# Patient Record
Sex: FEMALE | Race: WHITE | NOT HISPANIC OR LATINO | Employment: FULL TIME | URBAN - METROPOLITAN AREA
[De-identification: names, ages, dates, MRNs, and addresses within clinical notes are randomized per-mention and may not be internally consistent; named-entity substitution may affect disease eponyms.]

---

## 2023-03-21 ENCOUNTER — TELEPHONE (OUTPATIENT)
Dept: PRIMARY CARE | Facility: CLINIC | Age: 68
End: 2023-03-21
Payer: MEDICARE

## 2023-03-21 NOTE — TELEPHONE ENCOUNTER
----- Message from Loni Zepeda CMA sent at 3/20/2023  1:59 PM EDT -----  Left message for patient to return call.

## 2023-03-24 ENCOUNTER — TELEPHONE (OUTPATIENT)
Dept: PRIMARY CARE | Facility: CLINIC | Age: 68
End: 2023-03-24
Payer: MEDICARE

## 2023-03-24 NOTE — TELEPHONE ENCOUNTER
Patient called and stated that Dr Grossman was treating her for the Osteoporosis, but does not remember what the medication is that he gave her . So she is going to call their office and find out and call [facility] back.

## 2023-08-09 LAB — ALCOHOL (MG/DL) IN URINE: <10 MG/DL

## 2023-08-11 LAB — ETHYL GLUCURONIDE SCREEN: NEGATIVE NG/ML

## 2023-08-14 LAB
BUPRENORPHINE ,URINE: <2 NG/ML
BUPRENORPHINE GLUC, URINE: <5 NG/ML
NALOXONE, URINE: <100 NG/ML
NORBUPRENORPHINE GLUC,URINE: <5 NG/ML
NORBUPRENORPHINE, URINE: <2 NG/ML

## 2023-08-15 LAB
6-ACETYLMORPHINE: <25 NG/ML
7-AMINOCLONAZEPAM: <25 NG/ML
ALPHA-HYDROXYALPRAZOLAM: <25 NG/ML
ALPHA-HYDROXYMIDAZOLAM: <25 NG/ML
ALPRAZOLAM: <25 NG/ML
AMPHETAMINE (PRESENCE) IN URINE BY SCREEN METHOD: ABNORMAL
BARBITURATES PRESENCE IN URINE BY SCREEN METHOD: ABNORMAL
CANNABINOIDS IN URINE BY SCREEN METHOD: ABNORMAL
CHLORDIAZEPOXIDE: <25 NG/ML
CLONAZEPAM: <25 NG/ML
COCAINE (PRESENCE) IN URINE BY SCREEN METHOD: ABNORMAL
CODEINE: <50 NG/ML
CREATINE, URINE FOR DRUG: 38.4 MG/DL
DIAZEPAM: <25 NG/ML
DRUG SCREEN COMMENT URINE: ABNORMAL
EDDP: <25 NG/ML
FENTANYL CONFIRMATION, URINE: <2.5 NG/ML
HYDROCODONE: <25 NG/ML
HYDROMORPHONE: 80 NG/ML
LORAZEPAM: <25 NG/ML
METHADONE CONFIRMATION,URINE: <25 NG/ML
MIDAZOLAM: <25 NG/ML
MORPHINE URINE: >2500 NG/ML
N-DESMETHYLTAPENTADOL: <25 NG/ML
NORDIAZEPAM: <25 NG/ML
NORFENTANYL: <2.5 NG/ML
NORHYDROCODONE: <25 NG/ML
NOROXYCODONE: <25 NG/ML
O-DESMETHYLTRAMADOL: <50 NG/ML
OXAZEPAM: <25 NG/ML
OXYCODONE: <25 NG/ML
OXYMORPHONE: <25 NG/ML
PHENCYCLIDINE (PRESENCE) IN URINE BY SCREEN METHOD: ABNORMAL
TAPENTADOL: <25 NG/ML
TEMAZEPAM: <25 NG/ML
TRAMADOL: <50 NG/ML
ZOLPIDEM METABOLITE (ZCA): <25 NG/ML
ZOLPIDEM: <25 NG/ML

## 2023-09-13 PROBLEM — R52 PAIN AFTER RADIATION THERAPY: Status: ACTIVE | Noted: 2023-09-13

## 2023-09-13 PROBLEM — E03.9 ACQUIRED HYPOTHYROIDISM: Status: ACTIVE | Noted: 2023-09-13

## 2023-09-13 PROBLEM — G89.29 ABDOMINAL PAIN, CHRONIC, RIGHT UPPER QUADRANT: Status: ACTIVE | Noted: 2023-09-13

## 2023-09-13 PROBLEM — J44.9 CHRONIC OBSTRUCTIVE PULMONARY DISEASE (MULTI): Status: ACTIVE | Noted: 2023-09-13

## 2023-09-13 PROBLEM — M54.50 LUMBAGO: Status: ACTIVE | Noted: 2023-09-13

## 2023-09-13 PROBLEM — G89.3 CANCER-RELATED PAIN: Status: ACTIVE | Noted: 2023-09-13

## 2023-09-13 PROBLEM — R63.0 ANOREXIA: Status: ACTIVE | Noted: 2023-09-13

## 2023-09-13 PROBLEM — M25.511 RIGHT SHOULDER PAIN: Status: ACTIVE | Noted: 2023-09-13

## 2023-09-13 PROBLEM — R13.12 DYSPHAGIA, OROPHARYNGEAL PHASE: Status: ACTIVE | Noted: 2023-09-13

## 2023-09-13 PROBLEM — E55.9 VITAMIN D DEFICIENCY: Status: ACTIVE | Noted: 2023-09-13

## 2023-09-13 PROBLEM — K86.1 CHRONIC PANCREATITIS (MULTI): Status: ACTIVE | Noted: 2023-09-13

## 2023-09-13 PROBLEM — R10.11 ABDOMINAL PAIN, CHRONIC, RIGHT UPPER QUADRANT: Status: ACTIVE | Noted: 2023-09-13

## 2023-09-13 PROBLEM — Q45.3 PANCREATIC ABNORMALITY: Status: ACTIVE | Noted: 2023-09-13

## 2023-09-13 PROBLEM — C02.3 CANCER OF ANTERIOR TWO-THIRDS OF TONGUE (MULTI): Status: ACTIVE | Noted: 2023-09-13

## 2023-09-13 PROBLEM — E05.90 HYPERTHYROIDISM: Status: ACTIVE | Noted: 2023-09-13

## 2023-09-13 PROBLEM — M25.50 POLYARTHRALGIA: Status: ACTIVE | Noted: 2023-09-13

## 2023-09-13 PROBLEM — R91.8 MULTIPLE LUNG NODULES: Status: ACTIVE | Noted: 2023-09-13

## 2023-09-13 PROBLEM — K83.8: Status: ACTIVE | Noted: 2023-09-13
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
## 2023-09-13 PROBLEM — R51.9 FACIAL PAIN: Status: ACTIVE | Noted: 2023-09-13

## 2023-09-13 RX ORDER — ALBUTEROL SULFATE 90 UG/1
2 AEROSOL, METERED RESPIRATORY (INHALATION) EVERY 4 HOURS PRN
COMMUNITY
Start: 2020-04-13

## 2023-09-13 RX ORDER — DOCUSATE SODIUM 50 MG/5ML
10 LIQUID ORAL 2 TIMES DAILY
COMMUNITY
Start: 2020-11-04 | End: 2023-10-16 | Stop reason: ALTCHOICE

## 2023-09-13 RX ORDER — ERGOCALCIFEROL 1.25 MG/1
1 CAPSULE ORAL WEEKLY
COMMUNITY
Start: 2022-05-30 | End: 2023-12-26

## 2023-09-13 RX ORDER — DULOXETIN HYDROCHLORIDE 30 MG/1
1 CAPSULE, DELAYED RELEASE ORAL DAILY
COMMUNITY
Start: 2023-06-12 | End: 2023-10-16 | Stop reason: ALTCHOICE

## 2023-09-13 RX ORDER — DRONABINOL 5 MG/ML
SOLUTION ORAL EVERY 12 HOURS
COMMUNITY
Start: 2023-08-09 | End: 2023-10-16 | Stop reason: ALTCHOICE

## 2023-09-13 RX ORDER — MORPHINE SULFATE 20 MG/ML
SOLUTION ORAL
COMMUNITY
Start: 2022-03-02 | End: 2023-10-25 | Stop reason: SDUPTHER

## 2023-09-13 RX ORDER — UMECLIDINIUM BROMIDE AND VILANTEROL TRIFENATATE 62.5; 25 UG/1; UG/1
1 POWDER RESPIRATORY (INHALATION) DAILY
COMMUNITY
Start: 2022-08-26

## 2023-09-13 RX ORDER — NALOXONE HYDROCHLORIDE 4 MG/.1ML
SPRAY NASAL
COMMUNITY
Start: 2022-05-12

## 2023-09-13 RX ORDER — PROPRANOLOL HYDROCHLORIDE 20 MG/1
20 TABLET ORAL 4 TIMES DAILY
COMMUNITY
End: 2023-10-16 | Stop reason: ALTCHOICE

## 2023-09-14 ENCOUNTER — APPOINTMENT (OUTPATIENT)
Dept: PRIMARY CARE | Facility: CLINIC | Age: 68
End: 2023-09-14
Payer: MEDICARE

## 2023-10-16 ENCOUNTER — LAB (OUTPATIENT)
Dept: LAB | Facility: LAB | Age: 68
End: 2023-10-16
Payer: MEDICARE

## 2023-10-16 ENCOUNTER — OFFICE VISIT (OUTPATIENT)
Dept: PRIMARY CARE | Facility: CLINIC | Age: 68
End: 2023-10-16
Payer: MEDICARE

## 2023-10-16 VITALS
BODY MASS INDEX: 17.49 KG/M2 | DIASTOLIC BLOOD PRESSURE: 84 MMHG | SYSTOLIC BLOOD PRESSURE: 130 MMHG | WEIGHT: 105 LBS | HEART RATE: 105 BPM | HEIGHT: 65 IN

## 2023-10-16 DIAGNOSIS — E03.9 ACQUIRED HYPOTHYROIDISM: ICD-10-CM

## 2023-10-16 DIAGNOSIS — E55.9 VITAMIN D DEFICIENCY: ICD-10-CM

## 2023-10-16 DIAGNOSIS — E05.90 HYPERTHYROIDISM: ICD-10-CM

## 2023-10-16 DIAGNOSIS — E05.90 HYPERTHYROIDISM: Primary | ICD-10-CM

## 2023-10-16 DIAGNOSIS — E44.0 MALNUTRITION OF MODERATE DEGREE (MULTI): ICD-10-CM

## 2023-10-16 DIAGNOSIS — C78.7 MALIGNANT NEOPLASM METASTATIC TO LIVER (MULTI): ICD-10-CM

## 2023-10-16 DIAGNOSIS — J44.9 CHRONIC OBSTRUCTIVE PULMONARY DISEASE, UNSPECIFIED COPD TYPE (MULTI): ICD-10-CM

## 2023-10-16 DIAGNOSIS — Z00.00 MEDICARE ANNUAL WELLNESS VISIT, SUBSEQUENT: ICD-10-CM

## 2023-10-16 DIAGNOSIS — K86.1 CHRONIC PANCREATITIS, UNSPECIFIED PANCREATITIS TYPE (MULTI): ICD-10-CM

## 2023-10-16 LAB
ALBUMIN SERPL BCP-MCNC: 4.8 G/DL (ref 3.4–5)
ALP SERPL-CCNC: 161 U/L (ref 33–136)
ALT SERPL W P-5'-P-CCNC: 89 U/L (ref 7–45)
ANION GAP SERPL CALC-SCNC: 15 MMOL/L (ref 10–20)
AST SERPL W P-5'-P-CCNC: 95 U/L (ref 9–39)
BILIRUB SERPL-MCNC: 0.6 MG/DL (ref 0–1.2)
BUN SERPL-MCNC: 13 MG/DL (ref 6–23)
CALCIUM SERPL-MCNC: 10.1 MG/DL (ref 8.6–10.3)
CHLORIDE SERPL-SCNC: 96 MMOL/L (ref 98–107)
CHOLEST SERPL-MCNC: 212 MG/DL (ref 0–199)
CHOLESTEROL/HDL RATIO: 2.8
CO2 SERPL-SCNC: 27 MMOL/L (ref 21–32)
CREAT SERPL-MCNC: 0.7 MG/DL (ref 0.5–1.05)
ERYTHROCYTE [DISTWIDTH] IN BLOOD BY AUTOMATED COUNT: 13.5 % (ref 11.5–14.5)
GFR SERPL CREATININE-BSD FRML MDRD: >90 ML/MIN/1.73M*2
GLUCOSE SERPL-MCNC: 98 MG/DL (ref 74–99)
HCT VFR BLD AUTO: 43.2 % (ref 36–46)
HDLC SERPL-MCNC: 75.1 MG/DL
HGB BLD-MCNC: 13.9 G/DL (ref 12–16)
LDLC SERPL CALC-MCNC: 119 MG/DL
MCH RBC QN AUTO: 28.5 PG (ref 26–34)
MCHC RBC AUTO-ENTMCNC: 32.2 G/DL (ref 32–36)
MCV RBC AUTO: 89 FL (ref 80–100)
NON HDL CHOLESTEROL: 137 MG/DL (ref 0–149)
NRBC BLD-RTO: 0 /100 WBCS (ref 0–0)
PLATELET # BLD AUTO: 362 X10*3/UL (ref 150–450)
PMV BLD AUTO: 12 FL (ref 7.5–11.5)
POTASSIUM SERPL-SCNC: 4.5 MMOL/L (ref 3.5–5.3)
PROT SERPL-MCNC: 7.6 G/DL (ref 6.4–8.2)
RBC # BLD AUTO: 4.88 X10*6/UL (ref 4–5.2)
SODIUM SERPL-SCNC: 133 MMOL/L (ref 136–145)
T4 FREE SERPL-MCNC: 1.06 NG/DL (ref 0.61–1.12)
TRIGL SERPL-MCNC: 91 MG/DL (ref 0–149)
TSH SERPL-ACNC: 6.73 MIU/L (ref 0.44–3.98)
VIT B12 SERPL-MCNC: 594 PG/ML (ref 211–911)
VLDL: 18 MG/DL (ref 0–40)
WBC # BLD AUTO: 7.4 X10*3/UL (ref 4.4–11.3)

## 2023-10-16 PROCEDURE — 1159F MED LIST DOCD IN RCRD: CPT | Performed by: CLINICAL NURSE SPECIALIST

## 2023-10-16 PROCEDURE — 1125F AMNT PAIN NOTED PAIN PRSNT: CPT | Performed by: CLINICAL NURSE SPECIALIST

## 2023-10-16 PROCEDURE — G0444 DEPRESSION SCREEN ANNUAL: HCPCS | Performed by: CLINICAL NURSE SPECIALIST

## 2023-10-16 PROCEDURE — 36415 COLL VENOUS BLD VENIPUNCTURE: CPT

## 2023-10-16 PROCEDURE — 99214 OFFICE O/P EST MOD 30 MIN: CPT | Performed by: CLINICAL NURSE SPECIALIST

## 2023-10-16 PROCEDURE — 1036F TOBACCO NON-USER: CPT | Performed by: CLINICAL NURSE SPECIALIST

## 2023-10-16 PROCEDURE — 80061 LIPID PANEL: CPT

## 2023-10-16 PROCEDURE — 82607 VITAMIN B-12: CPT

## 2023-10-16 PROCEDURE — 85027 COMPLETE CBC AUTOMATED: CPT

## 2023-10-16 PROCEDURE — 80053 COMPREHEN METABOLIC PANEL: CPT

## 2023-10-16 PROCEDURE — 84439 ASSAY OF FREE THYROXINE: CPT

## 2023-10-16 PROCEDURE — 1170F FXNL STATUS ASSESSED: CPT | Performed by: CLINICAL NURSE SPECIALIST

## 2023-10-16 PROCEDURE — 84443 ASSAY THYROID STIM HORMONE: CPT

## 2023-10-16 PROCEDURE — G0439 PPPS, SUBSEQ VISIT: HCPCS | Performed by: CLINICAL NURSE SPECIALIST

## 2023-10-16 PROCEDURE — 1160F RVW MEDS BY RX/DR IN RCRD: CPT | Performed by: CLINICAL NURSE SPECIALIST

## 2023-10-16 RX ORDER — DRONABINOL 5 MG/ML
SOLUTION ORAL
COMMUNITY
Start: 2023-09-09 | End: 2023-11-22 | Stop reason: SDUPTHER

## 2023-10-16 RX ORDER — DULOXETIN HYDROCHLORIDE 60 MG/1
CAPSULE, DELAYED RELEASE ORAL
COMMUNITY
Start: 2023-09-18 | End: 2023-11-22 | Stop reason: SDUPTHER

## 2023-10-16 ASSESSMENT — ENCOUNTER SYMPTOMS
BLOOD IN STOOL: 0
MYALGIAS: 0
LOSS OF SENSATION IN FEET: 0
JOINT SWELLING: 0
FEVER: 0
PHOTOPHOBIA: 0
HEMATURIA: 0
WOUND: 0
ABDOMINAL PAIN: 0
BRUISES/BLEEDS EASILY: 0
CONSTIPATION: 0
PALPITATIONS: 0
OCCASIONAL FEELINGS OF UNSTEADINESS: 1
NAUSEA: 0
BACK PAIN: 0
ACTIVITY CHANGE: 0
NECK PAIN: 0
SHORTNESS OF BREATH: 0
ARTHRALGIAS: 1
FATIGUE: 0
DEPRESSION: 0
CHEST TIGHTNESS: 0
VOMITING: 0
CONFUSION: 0
APPETITE CHANGE: 0
DYSURIA: 0
COUGH: 0
SORE THROAT: 0
UNEXPECTED WEIGHT CHANGE: 0
TROUBLE SWALLOWING: 0
POLYDIPSIA: 0
FLANK PAIN: 0
DIARRHEA: 0
CHILLS: 0
WHEEZING: 0
DIZZINESS: 0
HEADACHES: 0
SEIZURES: 0
SLEEP DISTURBANCE: 0
EYE PAIN: 0

## 2023-10-16 ASSESSMENT — PATIENT HEALTH QUESTIONNAIRE - PHQ9
SUM OF ALL RESPONSES TO PHQ9 QUESTIONS 1 AND 2: 0
2. FEELING DOWN, DEPRESSED OR HOPELESS: NOT AT ALL
1. LITTLE INTEREST OR PLEASURE IN DOING THINGS: NOT AT ALL

## 2023-10-16 ASSESSMENT — ACTIVITIES OF DAILY LIVING (ADL)
TAKING_MEDICATION: INDEPENDENT
MANAGING_FINANCES: INDEPENDENT
DOING_HOUSEWORK: INDEPENDENT
DRESSING: INDEPENDENT
GROCERY_SHOPPING: INDEPENDENT
BATHING: INDEPENDENT

## 2023-10-16 NOTE — PATIENT INSTRUCTIONS
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
## 2023-10-18 ENCOUNTER — TELEPHONE (OUTPATIENT)
Dept: PRIMARY CARE | Facility: CLINIC | Age: 68
End: 2023-10-18
Payer: MEDICARE

## 2023-10-18 DIAGNOSIS — R79.89 ELEVATED LFTS: ICD-10-CM

## 2023-10-18 DIAGNOSIS — Q45.3 PANCREATIC ABNORMALITY: ICD-10-CM

## 2023-10-18 DIAGNOSIS — K86.1 CHRONIC PANCREATITIS, UNSPECIFIED PANCREATITIS TYPE (MULTI): ICD-10-CM

## 2023-10-18 DIAGNOSIS — E05.90 HYPERTHYROIDISM: ICD-10-CM

## 2023-10-18 DIAGNOSIS — E03.9 ACQUIRED HYPOTHYROIDISM: ICD-10-CM

## 2023-10-18 NOTE — TELEPHONE ENCOUNTER
----- Message from BASSAM Hickey-CNS sent at 10/17/2023  9:56 PM EDT -----  Please call patient with lab results. Thyroid is uncontrolled worse again. Would recommend that she consider discussing with Endo or restarting her medication. Cholesterol is also worse, will need to continue to monitor. Liver function is elevated. Last imaging I can find on the Liver was from 2021 and it was normal at that time. Will need to repeat Thyroid and CMP in 6-8 weeks. May need to further adjust medications at that time. Thank you!

## 2023-10-25 DIAGNOSIS — R51.9 FACIAL PAIN: ICD-10-CM

## 2023-10-25 DIAGNOSIS — G89.3 CANCER-RELATED PAIN: Primary | ICD-10-CM

## 2023-10-25 NOTE — TELEPHONE ENCOUNTER
Pt is requesting refill of  Liquid Morphine 100 mg/5mL Take 10 mg (0.5mL) up to 4 times a day prn pain                                                         LV:   9/18/23                 NV:   11/22/23               [place]ARRS reviewed with [facility]:  10/1/23  #60/30 days                        Pended RX to [facility] for transmission to pharmacy.

## 2023-10-27 RX ORDER — MORPHINE SULFATE 20 MG/ML
10 SOLUTION ORAL 4 TIMES DAILY PRN
Qty: 60 ML | Refills: 0 | Status: SHIPPED | OUTPATIENT
Start: 2023-10-31 | End: 2023-11-22 | Stop reason: SDUPTHER

## 2023-10-27 NOTE — TELEPHONE ENCOUNTER
Okay to refill liquid morphine 100 mg/5 mL.  Patient takes 10 mg up to 4 times per day which is equivalent to 60 mL/month.  Start date 10/31/2023.

## 2023-11-01 ENCOUNTER — TELEPHONE (OUTPATIENT)
Dept: PAIN MEDICINE | Facility: HOSPITAL | Age: 68
End: 2023-11-01
Payer: MEDICARE

## 2023-11-01 NOTE — TELEPHONE ENCOUNTER
She would like to know about a prescription she gets from us. [facility]. She want to know if she would be able to take care of kids while being on her pain medication and if it would effect her mind set in anyway.

## 2023-11-02 NOTE — TELEPHONE ENCOUNTER
Spoke with patient and advised her to have [facility] fax us a form requesting information with her signature authorizing release of information.  Pt verbalizes understanding.

## 2023-11-14 NOTE — PROGRESS NOTES
Provider Impressions     Status post surgery and radiation therapy for an anterior tongue cancer. I cannot appreciate any evidence of tumor recurrence.      Multiple pulmonary nodules which have been stable for years.  This is most likely benign.    Dysphagia. The main issue seems to be dryness. This seems to be stable.    Hypothyroidism.  The patient is feeling tired.  I did put her on Synthroid 50 mcg.  Repeat TSH will be obtained in 6 weeks.     I will see her in 6 months.        Chief Complaint     Follow-up status post surgery for the management of a tongue cancer      History of Present Illness    This lady was seen in October 2020 at the request of a local colleague. She was found to have a tongue cancer. On November 3, 2020 she underwent surgery. The margins at the time of the surgery were described as being negative. There was no evidence of any metastatic disease. On final pathology there was an area with severe dysplasia. She was presented at our tumor board and it was felt that she should undergo radiation. This was completed in late February 2021. The patient had significant discomfort and could not complete the entire treatment. She missed the last week. She stopped smoking in 2018. She had a TSH level done in October 2023 which was elevated.   She is feeling very tired.  She has not been put on thyroid medication.  She had a CT scan of the chest done in August 2023. It did not show any significant changes from previously. It did show multiple subcentimeter nodules.     Physical Exam    Examination of the oral cavity and oropharynx shows good healing of the surgical site. There is no evidence of any suspicious mucosal lesions. There is good tongue mobility. There is good mandibular excursion. Palpation of the parotid, neck, thyroid feel fails to show any worrisome masses or adenopathies.      A flexible laryngoscopy was carried out. Under topical Xylocaine and John-Synephrine the scope was introduced  through the nostril. The nasopharynx, base of tongue, hypopharynx, and larynx are visualized. The vocal cords are normally mobile. There is no pooling of secretions in the piriform sinuses. There is no evidence of any mucosal lesions.

## 2023-11-15 ENCOUNTER — OFFICE VISIT (OUTPATIENT)
Dept: OTOLARYNGOLOGY | Facility: CLINIC | Age: 68
End: 2023-11-15
Payer: MEDICARE

## 2023-11-15 VITALS — HEIGHT: 65 IN | BODY MASS INDEX: 16.81 KG/M2 | WEIGHT: 100.9 LBS | TEMPERATURE: 98.3 F

## 2023-11-15 DIAGNOSIS — C02.3 CANCER OF ANTERIOR TWO-THIRDS OF TONGUE (MULTI): Primary | ICD-10-CM

## 2023-11-15 DIAGNOSIS — R13.12 DYSPHAGIA, OROPHARYNGEAL PHASE: ICD-10-CM

## 2023-11-15 DIAGNOSIS — E03.9 ACQUIRED HYPOTHYROIDISM: ICD-10-CM

## 2023-11-15 PROCEDURE — 99213 OFFICE O/P EST LOW 20 MIN: CPT | Performed by: OTOLARYNGOLOGY

## 2023-11-15 PROCEDURE — 1125F AMNT PAIN NOTED PAIN PRSNT: CPT | Performed by: OTOLARYNGOLOGY

## 2023-11-15 PROCEDURE — 31575 DIAGNOSTIC LARYNGOSCOPY: CPT | Performed by: OTOLARYNGOLOGY

## 2023-11-15 PROCEDURE — 1159F MED LIST DOCD IN RCRD: CPT | Performed by: OTOLARYNGOLOGY

## 2023-11-15 PROCEDURE — 1160F RVW MEDS BY RX/DR IN RCRD: CPT | Performed by: OTOLARYNGOLOGY

## 2023-11-15 PROCEDURE — 1036F TOBACCO NON-USER: CPT | Performed by: OTOLARYNGOLOGY

## 2023-11-15 RX ORDER — IBUPROFEN 200 MG
TABLET ORAL 2 TIMES DAILY
COMMUNITY

## 2023-11-15 RX ORDER — LEVOTHYROXINE SODIUM 50 UG/1
50 TABLET ORAL
Qty: 90 TABLET | Refills: 0 | Status: SHIPPED | OUTPATIENT
Start: 2023-11-15 | End: 2024-01-24 | Stop reason: SDUPTHER

## 2023-11-15 ASSESSMENT — PATIENT HEALTH QUESTIONNAIRE - PHQ9
SUM OF ALL RESPONSES TO PHQ9 QUESTIONS 1 & 2: 0
2. FEELING DOWN, DEPRESSED OR HOPELESS: NOT AT ALL
1. LITTLE INTEREST OR PLEASURE IN DOING THINGS: NOT AT ALL

## 2023-11-22 ENCOUNTER — OFFICE VISIT (OUTPATIENT)
Dept: PAIN MEDICINE | Facility: HOSPITAL | Age: 68
End: 2023-11-22
Payer: MEDICARE

## 2023-11-22 VITALS
SYSTOLIC BLOOD PRESSURE: 143 MMHG | WEIGHT: 102.3 LBS | BODY MASS INDEX: 17.04 KG/M2 | RESPIRATION RATE: 16 BRPM | HEART RATE: 63 BPM | DIASTOLIC BLOOD PRESSURE: 83 MMHG | HEIGHT: 65 IN

## 2023-11-22 DIAGNOSIS — R51.9 FACIAL PAIN: ICD-10-CM

## 2023-11-22 DIAGNOSIS — R63.0 ANOREXIA: Primary | ICD-10-CM

## 2023-11-22 DIAGNOSIS — G89.3 CANCER-RELATED PAIN: ICD-10-CM

## 2023-11-22 DIAGNOSIS — M25.511 CHRONIC RIGHT SHOULDER PAIN: ICD-10-CM

## 2023-11-22 DIAGNOSIS — G89.29 CHRONIC RIGHT SHOULDER PAIN: ICD-10-CM

## 2023-11-22 DIAGNOSIS — M25.50 POLYARTHRALGIA: ICD-10-CM

## 2023-11-22 PROCEDURE — 1159F MED LIST DOCD IN RCRD: CPT | Performed by: CLINICAL NURSE SPECIALIST

## 2023-11-22 PROCEDURE — 99214 OFFICE O/P EST MOD 30 MIN: CPT | Performed by: CLINICAL NURSE SPECIALIST

## 2023-11-22 PROCEDURE — 1036F TOBACCO NON-USER: CPT | Performed by: CLINICAL NURSE SPECIALIST

## 2023-11-22 PROCEDURE — 1160F RVW MEDS BY RX/DR IN RCRD: CPT | Performed by: CLINICAL NURSE SPECIALIST

## 2023-11-22 PROCEDURE — 1125F AMNT PAIN NOTED PAIN PRSNT: CPT | Performed by: CLINICAL NURSE SPECIALIST

## 2023-11-22 RX ORDER — MORPHINE SULFATE 20 MG/ML
10 SOLUTION ORAL 4 TIMES DAILY PRN
Qty: 60 ML | Refills: 0 | Status: SHIPPED | OUTPATIENT
Start: 2023-11-30 | End: 2023-12-20 | Stop reason: SDUPTHER

## 2023-11-22 RX ORDER — DULOXETIN HYDROCHLORIDE 60 MG/1
CAPSULE, DELAYED RELEASE ORAL
Qty: 30 CAPSULE | Refills: 2 | Status: SHIPPED | OUTPATIENT
Start: 2023-11-22 | End: 2024-01-24 | Stop reason: SDUPTHER

## 2023-11-22 RX ORDER — DRONABINOL 5 MG/ML
SOLUTION ORAL
Qty: 30 ML | Refills: 0 | Status: SHIPPED | OUTPATIENT
Start: 2023-11-22

## 2023-11-22 ASSESSMENT — PATIENT HEALTH QUESTIONNAIRE - PHQ9
SUM OF ALL RESPONSES TO PHQ9 QUESTIONS 1 AND 2: 0
1. LITTLE INTEREST OR PLEASURE IN DOING THINGS: NOT AT ALL
2. FEELING DOWN, DEPRESSED OR HOPELESS: NOT AT ALL

## 2023-11-22 ASSESSMENT — COLUMBIA-SUICIDE SEVERITY RATING SCALE - C-SSRS
2. HAVE YOU ACTUALLY HAD ANY THOUGHTS OF KILLING YOURSELF?: NO
6. HAVE YOU EVER DONE ANYTHING, STARTED TO DO ANYTHING, OR PREPARED TO DO ANYTHING TO END YOUR LIFE?: NO
1. IN THE PAST MONTH, HAVE YOU WISHED YOU WERE DEAD OR WISHED YOU COULD GO TO SLEEP AND NOT WAKE UP?: NO

## 2023-11-22 ASSESSMENT — ENCOUNTER SYMPTOMS
LOSS OF SENSATION IN FEET: 0
OCCASIONAL FEELINGS OF UNSTEADINESS: 1
DEPRESSION: 0

## 2023-11-22 NOTE — ASSESSMENT & PLAN NOTE
68-year-old female with history of oropharyngeal cancer resulting in chronic cancer related pain as well as dysphagia.  Patient also has chronic low back pain and polyarticular pain.  Recent fall increasing pain to right rib area and right shoulder.  Patient continues to struggle with anorexia/weight loss related to swallowing difficulties and persistent oropharyngeal pain.  She is doing well on her current regimen of MS IR oral solution 10 mg up to 4 times per day, duloxetine 60 mg daily and Marinol 0.5 mL every 12 hours for appetite stimulation.  Patient states that the current pain regimen allows her to remain functional.  She has been trying to wean her dose of oral morphine and trying to take every 8 hours as opposed to every 6 hours.  Plan discussed with patient at today's office visit.    -We will continue MS IR oral solution on 100 mg per 5 mL's allowing the patient to take 10 mg up to 4 times per day as needed.  The patient will continue to attempt to take the lowest dose possible decreasing to 7.5 mg when able.  -Continue duloxetine 60 mg daily.  Patient may sprinkle in pudding if she is unable to swallow.  Currently tolerating without adverse effects.  -Continue Marinol 0.5 mL every 12 hours for appetite stimulation.  -Advised patient to add home exercises and stretches targeting right shoulder pain and stiffness.  Offered a formal course of physical therapy which the patient declined at this time.  -Patient will follow-up in 3 months or sooner if needed.    MEDICAL DECISION MAKING:    My impressions and treatment recommendations were discussed in detail with the patient, who verbalized understanding and had no further questions prior to discharge. Given the patient's report of reduced pain and improved functional ability without adverse effects,  it is reasonable to continue MS IR oral solution 10 mg up to 4 times per day as needed for pain.  The terms of the opioid agreement as well as the potential  risks and adverse effects of the patient's medication regimen were discussed in detail. This includes if applicable due to dosage of medication permission to discuss and coordinate care with other treatment providers relevant to the patients condition. The patient verbalized understanding.    Treatment goals were discussed in detail with the patient.  These goals include reduction of pain levels, improved levels of functioning, avoidance of medication side effect and lowest medication dose possible to achieve  these goals.  The patient was in full agreement with these goals.  Also discussed is the understanding that pain may not be eliminated by medication but that the goal of a better sustaining life through use of medication is appropriate.  Lifestyle modifications including weight management, stretching, diet, exercise and smoking are addressed at each office visit.  The patient provided a urine drug screen for routine monitoring and compliance.  This test may be given as frequently as every month based on the patient's individual opiate risk stratification and prescriber concerns for any aberrancies.  This test is indicated given the use of controlled substances for the patient's medical condition.  Unless otherwise noted the prior (s) urine drug testing results were consistent with prescribed medications.  There is no evidence of illicit drug use or additional medications ingested.     Risks and side effects of chronic opioid therapy including but not limited to tolerance, dependence, constipation, hyperalgesia, cognitive side effects, addiction and possible death due to overuse and or misuse were discussed. I also discussed that such medications when co-administered with other sedative agents including but not limited to alcohol, benzodiazepines, sedative hypnotics and illegal drugs could pose life threatening consequences including death.  I also explained the impact that the administration of such medication  has on a patient with obstructive sleep apnea and continued recommendations for use of apnea devices if ordered are prescribed by other physicians.  In order to effectively and safely treat the pain, I also emphasized the importance of compliance with the treatment plan as well as compliance with the terms of the opioid agreement which was reviewed in detail. I explained the importance of being responsible with the medications and to take these only as prescribed, never in excess and never for reasons other than pain reduction. The patient was counseled on keeping the medications safe and locked away from children and other adults as well as disposal methods and options. The patient understood the risks and instructions.      I also discussed with the patient in detail that based on the clinical response to the opioid medications and improvements of activities of daily living, sleep, and work performance in light of compliance with the treatment plan we can continue this form of therapy for the above chronic  pain.  The goal and rationale used for current treatment with chronic opioid medication is to control the pain and alleviate disability induced by the chronic pain condition noted above after failures of other non-opioid and nonpharmacological modalities to treat the chronic pain and the symptoms associated with have failed.  The patient understood the goals in terms of the above treatment plan and had no further questions prior to leaving the office today.    Given the patient's total MED, general use of daily opiates, or other coadministered medications in various classes the patient was offered a prescription for Narcan.  I instructed the patient that Narcan is for emergency use only and is worse suspected or known opiate overdose.  Call 911 prior to administration of this medication to activate the emergency response team.  It is imperative to fill this medication in order to demonstrate understanding of  the gravity of possible side effects including respiratory depression and risk of overdose of this opiate load or medication combination.  As such patients will be required to bring Narcan prescriptions to follow-up appointments as part of the compliance with continued opiate care.    Disclaimer: This note was transcribed using an audio transcription device.  As such, minor errors may be present with regard to spelling, punctuation, and inadvertent word insertion.  Please disregard such errors.
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
## 2023-12-20 DIAGNOSIS — G89.3 CANCER-RELATED PAIN: ICD-10-CM

## 2023-12-20 DIAGNOSIS — R51.9 FACIAL PAIN: ICD-10-CM

## 2023-12-20 NOTE — TELEPHONE ENCOUNTER
Requested refill for Morphine 100mg/5ml dose is 0.5ml=10mg every 6 hours. Per last note ok to continue at this dose. OARRS verified LFD: 11/30/23. Next SD: 12/30/23. LV: 11/22/23. NV: 1/24/24. [employer] [place].

## 2023-12-21 RX ORDER — MORPHINE SULFATE 20 MG/ML
10 SOLUTION ORAL 4 TIMES DAILY PRN
Qty: 60 ML | Refills: 0 | Status: SHIPPED | OUTPATIENT
Start: 2023-12-30 | End: 2024-02-01 | Stop reason: SDUPTHER

## 2023-12-21 NOTE — TELEPHONE ENCOUNTER
oK to refill liquid morphine 100 mg and 5 mL allowing patient to take 10 mg up to 4 times per day as needed for pain 60 mL for 30 days.

## 2023-12-24 DIAGNOSIS — E55.9 VITAMIN D DEFICIENCY: ICD-10-CM

## 2023-12-26 RX ORDER — ERGOCALCIFEROL 1.25 MG/1
1 CAPSULE ORAL
Qty: 5 CAPSULE | Refills: 0 | Status: SHIPPED | OUTPATIENT
Start: 2023-12-26

## 2024-01-24 ENCOUNTER — OFFICE VISIT (OUTPATIENT)
Dept: PAIN MEDICINE | Facility: HOSPITAL | Age: 69
End: 2024-01-24
Payer: MEDICARE

## 2024-01-24 ENCOUNTER — LAB (OUTPATIENT)
Dept: LAB | Facility: LAB | Age: 69
End: 2024-01-24
Payer: MEDICARE

## 2024-01-24 VITALS
RESPIRATION RATE: 16 BRPM | SYSTOLIC BLOOD PRESSURE: 137 MMHG | WEIGHT: 102.9 LBS | HEIGHT: 69 IN | BODY MASS INDEX: 15.24 KG/M2 | DIASTOLIC BLOOD PRESSURE: 78 MMHG | HEART RATE: 82 BPM

## 2024-01-24 DIAGNOSIS — R79.89 ELEVATED LFTS: ICD-10-CM

## 2024-01-24 DIAGNOSIS — E05.90 HYPERTHYROIDISM: ICD-10-CM

## 2024-01-24 DIAGNOSIS — G89.3 CANCER-RELATED PAIN: ICD-10-CM

## 2024-01-24 DIAGNOSIS — Z79.891 LONG TERM PRESCRIPTION OPIATE USE: Primary | ICD-10-CM

## 2024-01-24 DIAGNOSIS — R63.0 ANOREXIA: ICD-10-CM

## 2024-01-24 DIAGNOSIS — G89.29 CHRONIC RIGHT SHOULDER PAIN: ICD-10-CM

## 2024-01-24 DIAGNOSIS — M25.50 POLYARTHRALGIA: ICD-10-CM

## 2024-01-24 DIAGNOSIS — M25.511 CHRONIC RIGHT SHOULDER PAIN: ICD-10-CM

## 2024-01-24 DIAGNOSIS — R51.9 FACIAL PAIN: ICD-10-CM

## 2024-01-24 DIAGNOSIS — E03.9 ACQUIRED HYPOTHYROIDISM: ICD-10-CM

## 2024-01-24 LAB
ALBUMIN SERPL BCP-MCNC: 4.3 G/DL (ref 3.4–5)
ALP SERPL-CCNC: 44 U/L (ref 33–136)
ALT SERPL W P-5'-P-CCNC: 19 U/L (ref 7–45)
AMPHETAMINES UR QL SCN: NORMAL
ANION GAP SERPL CALC-SCNC: 12 MMOL/L (ref 10–20)
AST SERPL W P-5'-P-CCNC: 16 U/L (ref 9–39)
BARBITURATES UR QL SCN: NORMAL
BILIRUB SERPL-MCNC: 0.4 MG/DL (ref 0–1.2)
BUN SERPL-MCNC: 14 MG/DL (ref 6–23)
BZE UR QL SCN: NORMAL
CALCIUM SERPL-MCNC: 9.3 MG/DL (ref 8.6–10.3)
CANNABINOIDS UR QL SCN: NORMAL
CHLORIDE SERPL-SCNC: 97 MMOL/L (ref 98–107)
CO2 SERPL-SCNC: 31 MMOL/L (ref 21–32)
CREAT SERPL-MCNC: 0.61 MG/DL (ref 0.5–1.05)
CREAT UR-MCNC: 23.7 MG/DL (ref 20–320)
EGFRCR SERPLBLD CKD-EPI 2021: >90 ML/MIN/1.73M*2
ETHANOL ?TM UR-MCNC: <10 MG/DL
GLUCOSE SERPL-MCNC: 95 MG/DL (ref 74–99)
PCP UR QL SCN: NORMAL
POTASSIUM SERPL-SCNC: 4.6 MMOL/L (ref 3.5–5.3)
PROT SERPL-MCNC: 6.6 G/DL (ref 6.4–8.2)
SODIUM SERPL-SCNC: 135 MMOL/L (ref 136–145)
T4 FREE SERPL-MCNC: 0.95 NG/DL (ref 0.61–1.12)
TSH SERPL-ACNC: 4.41 MIU/L (ref 0.44–3.98)

## 2024-01-24 PROCEDURE — 36415 COLL VENOUS BLD VENIPUNCTURE: CPT

## 2024-01-24 PROCEDURE — 1125F AMNT PAIN NOTED PAIN PRSNT: CPT | Performed by: CLINICAL NURSE SPECIALIST

## 2024-01-24 PROCEDURE — 1036F TOBACCO NON-USER: CPT | Performed by: CLINICAL NURSE SPECIALIST

## 2024-01-24 PROCEDURE — 82570 ASSAY OF URINE CREATININE: CPT | Mod: 59,PORLAB | Performed by: CLINICAL NURSE SPECIALIST

## 2024-01-24 PROCEDURE — 1160F RVW MEDS BY RX/DR IN RCRD: CPT | Performed by: CLINICAL NURSE SPECIALIST

## 2024-01-24 PROCEDURE — 84439 ASSAY OF FREE THYROXINE: CPT

## 2024-01-24 PROCEDURE — 80307 DRUG TEST PRSMV CHEM ANLYZR: CPT | Mod: PORLAB | Performed by: CLINICAL NURSE SPECIALIST

## 2024-01-24 PROCEDURE — 80348 DRUG SCREENING BUPRENORPHINE: CPT | Performed by: CLINICAL NURSE SPECIALIST

## 2024-01-24 PROCEDURE — 99214 OFFICE O/P EST MOD 30 MIN: CPT | Performed by: CLINICAL NURSE SPECIALIST

## 2024-01-24 PROCEDURE — 80346 BENZODIAZEPINES1-12: CPT | Mod: PORLAB,MUE | Performed by: CLINICAL NURSE SPECIALIST

## 2024-01-24 PROCEDURE — 80372 DRUG SCREENING TAPENTADOL: CPT | Mod: PORLAB | Performed by: CLINICAL NURSE SPECIALIST

## 2024-01-24 PROCEDURE — 84443 ASSAY THYROID STIM HORMONE: CPT

## 2024-01-24 PROCEDURE — 80053 COMPREHEN METABOLIC PANEL: CPT

## 2024-01-24 PROCEDURE — 1159F MED LIST DOCD IN RCRD: CPT | Performed by: CLINICAL NURSE SPECIALIST

## 2024-01-24 RX ORDER — DULOXETIN HYDROCHLORIDE 60 MG/1
CAPSULE, DELAYED RELEASE ORAL
Qty: 30 CAPSULE | Refills: 2 | Status: SHIPPED | OUTPATIENT
Start: 2024-01-24 | End: 2024-04-24 | Stop reason: SDUPTHER

## 2024-01-24 RX ORDER — LEVOTHYROXINE SODIUM 75 UG/1
75 TABLET ORAL
Qty: 90 TABLET | Refills: 3 | Status: SHIPPED | OUTPATIENT
Start: 2024-01-24 | End: 2025-01-23

## 2024-01-24 ASSESSMENT — ENCOUNTER SYMPTOMS
LOSS OF SENSATION IN FEET: 0
OCCASIONAL FEELINGS OF UNSTEADINESS: 0
DEPRESSION: 0

## 2024-01-24 ASSESSMENT — COLUMBIA-SUICIDE SEVERITY RATING SCALE - C-SSRS
6. HAVE YOU EVER DONE ANYTHING, STARTED TO DO ANYTHING, OR PREPARED TO DO ANYTHING TO END YOUR LIFE?: NO
2. HAVE YOU ACTUALLY HAD ANY THOUGHTS OF KILLING YOURSELF?: NO
1. IN THE PAST MONTH, HAVE YOU WISHED YOU WERE DEAD OR WISHED YOU COULD GO TO SLEEP AND NOT WAKE UP?: NO

## 2024-01-24 NOTE — PROGRESS NOTES
Subjective   Patient ID: Nayely Barrera is a 68 y.o. female who presents for chronic oropharyngeal pain related to cancer      HPI    68-year-old female with history of oropharyngeal cancer status post resection and radiation treatment resulting in chronic dysphagia and pain. Also has chronic low back pain and polyarticular pain most noted in R shoulder.  Previous fall resulting in right-sided rib pain/fracture.  Recent diagnosis of osteoporosis and prescribed Reclast.  Placed on a short course of gabapentin by her PCP.  Maintained on immediate release liquid morphine due to difficulty swallowing. Added marinol for anorexia/weight gain and duloxetine.  Patient presents at today's office visit with chronic mouth/throat pain related to oropharyngeal cancer, chronic low back pain and polyarticular pain.  Pain from recent fall most noted in right ribs has significantly improved.  Continues to describe her pain as aching, burning and throbbing.  Mouth/throat pain increased with eating.  Patient states that she has difficulty/pain with swallowing and also has pain on her tongue with eating.  She is limited to a soft diet and continues to have difficulty with her appetite/weight loss.  She continues to experience low back pain which can radiate to her right hip and right lower extremity.  She does have intermittent numbness and tingling along with weakness to her right lower extremity unchanged from prior exam.  She denies any changes in bowel/bladder function.  Continued difficulty with pain/range of motion of her right shoulder.  She is continuing to manage her pain with MS IR oral solution 10 mg up to 4 times per day as needed.  The patient states with the addition of duloxetine she feels that she has been able to use her morphine 3-4 times per day.  She is trying to limit use to 3 times per day dosing.  She continues to benefit from duloxetine 60 mg daily which she feels has been very helpful.  She is continuing to  "use Marinol as an appetite stimulant for anorexia.  She states that she has noticed she often times forgets to use this medication.  She does feel that Marinol makes her think more about eating.  She has been supplementing with boost/Ensure using up to 3/day.  She continues to do home stretches and physical therapy exercises consistently.  She feels that this has been helping increase her strength/endurance.    Location of Pain: Patient returns to the office for interval re-evaluation of \"right shoulder pain that radiates down right arm to elbow and up neck, low right back pain/right hip radiates to the knee, mouth from cancer\" stated pain complaints.        Pain Score: 5/10     Treatment: Morphine Sulfate Oral Solution 0.5ml up to 4 times a day   Medication Count: 28 mL  Fill Date: 1/3/24?   Last Dose: 1/24/24 2 doses and take up to 4 doses a day but usually keeps it at 3  Efficacy: 50% for 4hrs  Side Effects: Denies     Narcan: Pt brought unopened Narcan to today's visit. EXP. 6/2024     Other treatment plans-medications/neuromodulators: Duloxetine 60mg 1 daily     Injections and/or Procedures: Denies     Pregnant: n/a     Pt sts, \"I'm satisfied with my current plan of care\"  OARRS:  No data recorded  I have personally reviewed the OARRS report for Nayely Barrera. I have considered the risks of abuse, dependence, addiction and diversion    Is the patient prescribed a combination of a benzodiazepine and opioid?  No    Last Urine Drug Screen / ordered today: No  Recent Results (from the past 8760 hour(s))   Buprenorphine Confirm,Urine    Collection Time: 08/09/23 11:08 AM   Result Value Ref Range    BUPRENORPHINE GLUC, URINE <5 ng/mL    BUPRENORPHINE ,URINE <2 ng/mL    NALOXONE, URINE <100 ng/mL    NORBUPRENORPHINE GLUC,URINE <5 ng/mL    NORBUPRENORPHINE, URINE <2 ng/mL   OPIATE/OPIOID/BENZO PRESCRIPTION COMPLIANCE    Collection Time: 08/09/23 11:08 AM   Result Value Ref Range    DRUG SCREEN COMMENT URINE SEE " BELOW     Creatine, Urine 38.4 mg/dL    Amphetamine Screen, Urine PRESUMPTIVE NEGATIVE NEGATIVE    Barbiturate Screen, Urine PRESUMPTIVE NEGATIVE NEGATIVE    Cannabinoid Screen, Urine PRESUMPTIVE NEGATIVE NEGATIVE    Cocaine Screen, Urine PRESUMPTIVE NEGATIVE NEGATIVE    PCP Screen, Urine PRESUMPTIVE NEGATIVE NEGATIVE    7-Aminoclonazepam <25 Cutoff <25 ng/mL    Alpha-Hydroxyalprazolam <25 Cutoff <25 ng/mL    Alpha-Hydroxymidazolam <25 Cutoff <25 ng/mL    Alprazolam <25 Cutoff <25 ng/mL    Chlordiazepoxide <25 Cutoff <25 ng/mL    Clonazepam <25 Cutoff <25 ng/mL    Diazepam <25 Cutoff <25 ng/mL    Lorazepam <25 Cutoff <25 ng/mL    Midazolam <25 Cutoff <25 ng/mL    Nordiazepam <25 Cutoff <25 ng/mL    Oxazepam <25 Cutoff <25 ng/mL    Temazepam <25 Cutoff <25 ng/mL    Zolpidem <25 Cutoff <25 ng/mL    Zolpidem Metabolite (ZCA) <25 Cutoff <25 ng/mL    6-Acetylmorphine <25 Cutoff <25 ng/mL    Codeine <50 Cutoff <50 ng/mL    Hydrocodone <25 Cutoff <25 ng/mL    Hydromorphone 80 (A) Cutoff <25 ng/mL    Morphine Urine >2500 (A) Cutoff <50 ng/mL    Norhydrocodone <25 Cutoff <25 ng/mL    Noroxycodone <25 Cutoff <25 ng/mL    Oxycodone <25 Cutoff <25 ng/mL    Oxymorphone <25 Cutoff <25 ng/mL    Tramadol <50 Cutoff <50 ng/mL    O-Desmethyltramadol <50 Cutoff <50 ng/mL    Fentanyl <2.5 Cutoff<2.5 ng/mL    Norfentanyl <2.5 Cutoff<2.5 ng/mL    METHADONE CONFIRMATION,URINE <25 Cutoff <25 ng/mL    EDDP <25 Cutoff <25 ng/mL   Tapentadol Confirmation, Urine    Collection Time: 08/09/23 11:08 AM   Result Value Ref Range    Tapentadol <25 Cutoff <25 ng/mL    N-Desmethyltapentadol <25 Cutoff <25 ng/mL     Results are as expected.     Controlled Substance Agreement: 6/12/23  Date of the Last Agreement: 6/12/23  Reviewed Controlled Substance Agreement including but not limited to the benefits, risks, and alternatives to treatment with a Controlled Substance medication(s).    Monitoring and compliance: 1/24/24    ORT: 6/12/23    PDUQ: 1/24/24  score     Office Agreement: 6/12/23      Review of Systems    ROS:   General: No fevers, chills, weight loss  Skin: Negative for lesions  Eyes: No acute vision changes  Ears: No vertigo  Nose, mouth, throat: No difficulty swallowing or speaking  Respiratory: No cough, shortness of breath, cyanosis  Cardiovascular: Negative for chest pain syncope or palpitation  Gastrointestinal: No constipation, nausea, vomiting  Neurological: Negative for headache, positive for: Paresthesia and weakness  Psychological: Negative for severe or debilitating anxiety, depression. Negative memory loss  Musculoskeletal: Positive for arthralgia, myalgia and pain  Endocrine: Negative for weight gain, appetite changes, excessive sweating  Allergy/immune: Negative    All 13 systems were reviewed and are within normal levels except as noted or in the history of present illness.  Positive or pertinent negative responses are noted or were in the history of present illness. As noted, the patient denies significant or impairing weakness in the bilateral upper and lower extremities, medication induced constipation, and bowel or bladder incontinence.     Current Outpatient Medications:     albuterol 90 mcg/actuation inhaler, Inhale 2 puffs every 4 hours if needed., Disp: , Rfl:     Anoro Ellipta 62.5-25 mcg/actuation blister with device, Inhale 1 puff once daily., Disp: , Rfl:     droNABinol (Syndros) 5 mg/mL solution, 0.5 mL EVERY 12 HOURS (route: oral), Disp: 30 mL, Rfl: 0    DULoxetine (Cymbalta) 60 mg DR capsule, TAKE ONE CAPSULE BY MOUTH DAILY. PATIENT MAY OPEN CAPSULE AND SPRINKLE IN FOOD., Disp: 30 capsule, Rfl: 2    ergocalciferol (Vitamin D-2) 1.25 MG (13093 UT) capsule, TAKE ONE CAPSULE BY MOUTH ONCE A WEEK, Disp: 5 capsule, Rfl: 0    ibuprofen 200 mg tablet, Take by mouth 2 times a day., Disp: , Rfl:     levothyroxine (Synthroid, Levoxyl) 50 mcg tablet, Take 1 tablet (50 mcg) by mouth once daily in the morning. Take before meals. Take all  by itself with water only and then wait at least 1 hour before having food or other medication., Disp: 90 tablet, Rfl: 0    morphine 100 mg/5 mL (20 mg/mL) concentrated oral solution, Take 0.5 mL (10 mg) by mouth 4 times a day as needed for severe pain (7 - 10). Do not start before December 30, 2023., Disp: 60 mL, Rfl: 0    naloxone (Narcan) 4 mg/0.1 mL nasal spray, Administer into affected nostril(s)., Disp: , Rfl:      No past medical history on file.     Past Surgical History:   Procedure Laterality Date    OTHER SURGICAL HISTORY  08/24/2022    Tubal ligation    OTHER SURGICAL HISTORY  08/24/2022    Cholecystectomy    OTHER SURGICAL HISTORY  08/24/2022    Hip surgery    OTHER SURGICAL HISTORY  08/24/2022    Tongue surgery    OTHER SURGICAL HISTORY  08/24/2022    Shoulder surgery        No family history on file.     No Known Allergies     Objective     Visit Vitals  Smoking Status Never        Physical Exam    PE:  General: Thin/frail female in no acute distress.  The patient demonstrates no pain behavior, symptom magnification or overt drug-seeking behavior.  Eye: Pupils appropriate for room lighting  Neck/thyroid: No obvious goiter or enlargement of neck noted  Respiratory exam: Normal respiratory effort, unlabored respiration. No accessory muscle use noted  Cardiac exam: Bilateral radial pulses intact  Abdominal: Nondistended  Spine, lumbar: The patient is able to rise from a seated to standing position without hesitancy, push off, or delay. Gait is grossly nonantalgic.  Forward leaning posture.  Tenderness to paraspinous musculature is noted by spine right greater than left.  Flexion and extension improved.  Mild tenderness over right ribs.    Ortho: Right shoulder: Pain with active/passive range of motion of right shoulder.  Chronic elevation of right shoulder.  Myofascial tightness/tenderness right trapezius.  Neurologic exam: Muscle strength is antigravity in all 4 extremities.  Equal muscle strength  bilateral upper and lower extremities 5/5.  Psychiatric exam: Judgment and insight normal, affect normal, speech is fluent, affect appropriate, demonstrating no signs of hypersomnolence, sedation, or confusion            Assessment/Plan   Problem List Items Addressed This Visit             ICD-10-CM    Anorexia R63.0    Cancer-related pain - Primary G89.3     68 year-old female with history of oropharyngeal cancer resulting in chronic cancer related pain as well as dysphagia. Patient also has chronic low back pain and polyarticular pain.  Improvement in right rib pain after recent fall.  Patient states that this pain is slowly resolving.  Patient continues to struggle with anorexia/weight loss related to swallowing difficulties and persistent oropharyngeal pain.  She is doing well on her current regimen of MS IR oral solution 10 mg up to 4 times per day, duloxetine 60 mg daily and Marinol 0.5 mL every 12 hours for appetite stimulation.  She admits to forgetting to take her Marinol.  She is willing to try to take this medication more consistently to promote appetite stimulation.  Patient states that the current pain regimen allows her to remain functional.  She continues to try to decrease her dose of oral morphine.  She is trying to limit oral morphine use to 3 times per day dosing and using the fourth dose only when pain is more intense.  Plan discussed with patient at today's office visit.    -We will continue oral morphine, MS IR 10 mg every 6 hours as needed for pain.  Patient will continue to attempt to decrease her dose to 3 times per day dosing.  She continues to benefit from this medication and states that it has allowed her to remain functional and has helped with oral pharyngeal pain increased with swallowing/eating.  -Continue duloxetine 60 mg daily.  Currently tolerating without adverse effects.  Patient feels that the addition of this medication has been beneficial for oropharyngeal pain as well as  arthritic pain.  -Patient will continue Marinol use for appetite stimulation/anorexia.  -She will continue daily home stretches and physical therapy exercises.  She has noted improvement in strength and endurance with daily exercise.  -Patient will follow-up in 3 months or sooner in our office if needed.    MEDICAL DECISION MAKING:    My impressions and treatment recommendations were discussed in detail with the patient, who verbalized understanding and had no further questions prior to discharge. Given the patient's report of reduced pain and improved functional ability without adverse effects,  it is reasonable to continue MS IR 10 mg every 6 hours as needed for pain.  The terms of the opioid agreement as well as the potential risks and adverse effects of the patient's medication regimen were discussed in detail. This includes if applicable due to dosage of medication permission to discuss and coordinate care with other treatment providers relevant to the patients condition. The patient verbalized understanding.    Treatment goals were discussed in detail with the patient.  These goals include reduction of pain levels, improved levels of functioning, avoidance of medication side effect and lowest medication dose possible to achieve  these goals.  The patient was in full agreement with these goals.  Also discussed is the understanding that pain may not be eliminated by medication but that the goal of a better sustaining life through use of medication is appropriate.  Lifestyle modifications including weight management, stretching, diet, exercise and smoking are addressed at each office visit.  The patient provided a urine drug screen for routine monitoring and compliance.  This test may be given as frequently as every month based on the patient's individual opiate risk stratification and prescriber concerns for any aberrancies.  This test is indicated given the use of controlled substances for the patient's medical  condition.  Unless otherwise noted the prior (s) urine drug testing results were consistent with prescribed medications.  There is no evidence of illicit drug use or additional medications ingested.     Risks and side effects of chronic opioid therapy including but not limited to tolerance, dependence, constipation, hyperalgesia, cognitive side effects, addiction and possible death due to overuse and or misuse were discussed. I also discussed that such medications when co-administered with other sedative agents including but not limited to alcohol, benzodiazepines, sedative hypnotics and illegal drugs could pose life threatening consequences including death.  I also explained the impact that the administration of such medication has on a patient with obstructive sleep apnea and continued recommendations for use of apnea devices if ordered are prescribed by other physicians.  In order to effectively and safely treat the pain, I also emphasized the importance of compliance with the treatment plan as well as compliance with the terms of the opioid agreement which was reviewed in detail. I explained the importance of being responsible with the medications and to take these only as prescribed, never in excess and never for reasons other than pain reduction. The patient was counseled on keeping the medications safe and locked away from children and other adults as well as disposal methods and options. The patient understood the risks and instructions.      I also discussed with the patient in detail that based on the clinical response to the opioid medications and improvements of activities of daily living, sleep, and work performance in light of compliance with the treatment plan we can continue this form of therapy for the above chronic  pain.  The goal and rationale used for current treatment with chronic opioid medication is to control the pain and alleviate disability induced by the chronic pain condition noted above  after failures of other non-opioid and nonpharmacological modalities to treat the chronic pain and the symptoms associated with have failed.  The patient understood the goals in terms of the above treatment plan and had no further questions prior to leaving the office today.    Given the patient's total MED, general use of daily opiates, or other coadministered medications in various classes the patient was offered a prescription for Narcan.  I instructed the patient that Narcan is for emergency use only and is worse suspected or known opiate overdose.  Call 911 prior to administration of this medication to activate the emergency response team.  It is imperative to fill this medication in order to demonstrate understanding of the gravity of possible side effects including respiratory depression and risk of overdose of this opiate load or medication combination.  As such patients will be required to bring Narcan prescriptions to follow-up appointments as part of the compliance with continued opiate care.    Disclaimer: This note was transcribed using an audio transcription device.  As such, minor errors may be present with regard to spelling, punctuation, and inadvertent word insertion.  Please disregard such errors.                Relevant Medications    DULoxetine (Cymbalta) 60 mg DR capsule    Facial pain R51.9    Relevant Medications    DULoxetine (Cymbalta) 60 mg DR capsule    Polyarthralgia M25.50    Relevant Medications    DULoxetine (Cymbalta) 60 mg DR capsule    Right shoulder pain M25.511

## 2024-01-24 NOTE — ASSESSMENT & PLAN NOTE
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 Patient also has chronic low back pain and polyarticular pain.  Improvement in right rib pain after recent fall.  Patient states that this pain is slowly resolving.  Patient continues to struggle with anorexia/weight loss related to swallowing difficulties and persistent oropharyngeal pain.  She is doing well on her current regimen of MS IR oral solution 10 mg up to 4 times per day, duloxetine 60 mg daily and Marinol 0.5 mL every 12 hours for appetite stimulation.  She admits to forgetting to take her Marinol.  She is willing to try to take this medication more consistently to promote appetite stimulation.  Patient states that the current pain regimen allows her to remain functional.  She continues to try to decrease her dose of oral morphine.  She is trying to limit oral morphine use to 3 times per day dosing and using the fourth dose only when pain is more intense.  Plan discussed with patient at today's office visit.    -We will continue oral morphine, MS IR 10 mg every 6 hours as needed for pain.  Patient will continue to attempt to decrease her dose to 3 times per day dosing.  She continues to benefit from this medication and states that it has allowed her to remain functional and has helped with oral pharyngeal pain increased with swallowing/eating.  -Continue duloxetine 60 mg daily.  Currently tolerating without adverse effects.  Patient feels that the addition of this medication has been beneficial for oropharyngeal pain as well as arthritic pain.  -Patient will continue Marinol use for appetite stimulation/anorexia.  -She will continue daily home stretches and physical therapy exercises.  She has noted improvement in strength and endurance with daily exercise.  -Patient will follow-up in 3 months or sooner in our office if needed.    MEDICAL DECISION MAKING:    My impressions and treatment  recommendations were discussed in detail with the patient, who verbalized understanding and had no further questions prior to discharge. Given the patient's report of reduced pain and improved functional ability without adverse effects,  it is reasonable to continue MS IR 10 mg every 6 hours as needed for pain.  The terms of the opioid agreement as well as the potential risks and adverse effects of the patient's medication regimen were discussed in detail. This includes if applicable due to dosage of medication permission to discuss and coordinate care with other treatment providers relevant to the patients condition. The patient verbalized understanding.    Treatment goals were discussed in detail with the patient.  These goals include reduction of pain levels, improved levels of functioning, avoidance of medication side effect and lowest medication dose possible to achieve  these goals.  The patient was in full agreement with these goals.  Also discussed is the understanding that pain may not be eliminated by medication but that the goal of a better sustaining life through use of medication is appropriate.  Lifestyle modifications including weight management, stretching, diet, exercise and smoking are addressed at each office visit.  The patient provided a urine drug screen for routine monitoring and compliance.  This test may be given as frequently as every month based on the patient's individual opiate risk stratification and prescriber concerns for any aberrancies.  This test is indicated given the use of controlled substances for the patient's medical condition.  Unless otherwise noted the prior (s) urine drug testing results were consistent with prescribed medications.  There is no evidence of illicit drug use or additional medications ingested.     Risks and side effects of chronic opioid therapy including but not limited to tolerance, dependence, constipation, hyperalgesia, cognitive side effects, addiction  and possible death due to overuse and or misuse were discussed. I also discussed that such medications when co-administered with other sedative agents including but not limited to alcohol, benzodiazepines, sedative hypnotics and illegal drugs could pose life threatening consequences including death.  I also explained the impact that the administration of such medication has on a patient with obstructive sleep apnea and continued recommendations for use of apnea devices if ordered are prescribed by other physicians.  In order to effectively and safely treat the pain, I also emphasized the importance of compliance with the treatment plan as well as compliance with the terms of the opioid agreement which was reviewed in detail. I explained the importance of being responsible with the medications and to take these only as prescribed, never in excess and never for reasons other than pain reduction. The patient was counseled on keeping the medications safe and locked away from children and other adults as well as disposal methods and options. The patient understood the risks and instructions.      I also discussed with the patient in detail that based on the clinical response to the opioid medications and improvements of activities of daily living, sleep, and work performance in light of compliance with the treatment plan we can continue this form of therapy for the above chronic  pain.  The goal and rationale used for current treatment with chronic opioid medication is to control the pain and alleviate disability induced by the chronic pain condition noted above after failures of other non-opioid and nonpharmacological modalities to treat the chronic pain and the symptoms associated with have failed.  The patient understood the goals in terms of the above treatment plan and had no further questions prior to leaving the office today.    Given the patient's total MED, general use of daily opiates, or other coadministered  medications in various classes the patient was offered a prescription for Narcan.  I instructed the patient that Narcan is for emergency use only and is worse suspected or known opiate overdose.  Call 911 prior to administration of this medication to activate the emergency response team.  It is imperative to fill this medication in order to demonstrate understanding of the gravity of possible side effects including respiratory depression and risk of overdose of this opiate load or medication combination.  As such patients will be required to bring Narcan prescriptions to follow-up appointments as part of the compliance with continued opiate care.    Disclaimer: This note was transcribed using an audio transcription device.  As such, minor errors may be present with regard to spelling, punctuation, and inadvertent word insertion.  Please disregard such errors.

## 2024-01-25 ENCOUNTER — TELEPHONE (OUTPATIENT)
Dept: PRIMARY CARE | Facility: CLINIC | Age: 69
End: 2024-01-25
Payer: MEDICARE

## 2024-01-25 DIAGNOSIS — E05.90 HYPERTHYROIDISM: ICD-10-CM

## 2024-01-25 NOTE — TELEPHONE ENCOUNTER
----- Message from BASSAM Hickey-CNS sent at 1/24/2024  7:05 PM EST -----  Please let patient know that Liver enzymes have improved. Thyroid has trended down but still uncontrolled. Would like to further increase. Medication E-Prescribed. Repeat Thyroid panel prior to follow up appointment. Thank you!

## 2024-01-28 LAB
1OH-MIDAZOLAM UR CFM-MCNC: <25 NG/ML
6MAM UR CFM-MCNC: <25 NG/ML
7AMINOCLONAZEPAM UR CFM-MCNC: <25 NG/ML
A-OH ALPRAZ UR CFM-MCNC: <25 NG/ML
ALPRAZ UR CFM-MCNC: <25 NG/ML
CHLORDIAZEP UR CFM-MCNC: <25 NG/ML
CLONAZEPAM UR CFM-MCNC: <25 NG/ML
CODEINE UR CFM-MCNC: <50 NG/ML
DIAZEPAM UR CFM-MCNC: <25 NG/ML
EDDP UR CFM-MCNC: <25 NG/ML
FENTANYL UR CFM-MCNC: <2.5 NG/ML
HYDROCODONE CTO UR CFM-MCNC: <25 NG/ML
HYDROMORPHONE UR CFM-MCNC: 73 NG/ML
LORAZEPAM UR CFM-MCNC: <25 NG/ML
METHADONE UR CFM-MCNC: <25 NG/ML
MIDAZOLAM UR CFM-MCNC: <25 NG/ML
MORPHINE UR CFM-MCNC: >2500 NG/ML
NORDIAZEPAM UR CFM-MCNC: <25 NG/ML
NORFENTANYL UR CFM-MCNC: <2.5 NG/ML
NORHYDROCODONE UR CFM-MCNC: <25 NG/ML
NOROXYCODONE UR CFM-MCNC: <25 NG/ML
NORTAPENTADOL UR CFM-MCNC: <25 NG/ML
NORTRAMADOL UR-MCNC: <50 NG/ML
OXAZEPAM UR CFM-MCNC: <25 NG/ML
OXYCODONE UR CFM-MCNC: <25 NG/ML
OXYMORPHONE UR CFM-MCNC: <25 NG/ML
TAPENTADOL UR CFM-MCNC: <25 NG/ML
TEMAZEPAM UR CFM-MCNC: <25 NG/ML
TRAMADOL UR CFM-MCNC: <50 NG/ML
ZOLPIDEM UR CFM-MCNC: <25 NG/ML
ZOLPIDEM UR-MCNC: <25 NG/ML

## 2024-01-29 LAB
BUPRENORPHINE UR-MCNC: <2 NG/ML
BUPRENORPHINE UR-MCNC: <5 NG/ML
NALOXONE UR CFM-MCNC: <100 NG/ML
NORBUPRENORPHINE UR CFM-MCNC: <5 NG/ML
NORBUPRENORPHINE UR-MCNC: <2 NG/ML

## 2024-02-01 DIAGNOSIS — G89.3 CANCER-RELATED PAIN: ICD-10-CM

## 2024-02-01 DIAGNOSIS — R51.9 FACIAL PAIN: ICD-10-CM

## 2024-02-01 NOTE — TELEPHONE ENCOUNTER
Pt is requesting refill of  morphine 100 mg/5mL (20 mg/mL) concentrated oral solution 10 mg QID  [facility]                                                         LV:  1/24/24                  NV:  4/24/24                OARRS reviewed with LFD:  12/30/23  #60/30 days                          Pended RX to TED Monson for transmission to pharmacy.

## 2024-02-02 RX ORDER — MORPHINE SULFATE 20 MG/ML
10 SOLUTION ORAL 4 TIMES DAILY PRN
Qty: 60 ML | Refills: 0 | Status: SHIPPED | OUTPATIENT
Start: 2024-02-02 | End: 2024-03-07 | Stop reason: SDUPTHER

## 2024-02-02 NOTE — TELEPHONE ENCOUNTER
Okay to refill morphine solution 100 mg per 5 Tylenols or 20 mg/mL with patient taking 10 mg every 6 hours as needed.

## 2024-02-08 ENCOUNTER — TELEPHONE (OUTPATIENT)
Dept: PRIMARY CARE | Facility: CLINIC | Age: 69
End: 2024-02-08
Payer: MEDICARE

## 2024-02-08 NOTE — TELEPHONE ENCOUNTER
She was seen at [facility] urgent care last night / had x-rays of ribs & hip   She was told to call PCP for possible order for CT scan

## 2024-02-14 ENCOUNTER — TELEPHONE (OUTPATIENT)
Dept: PRIMARY CARE | Facility: CLINIC | Age: 69
End: 2024-02-14

## 2024-02-14 ENCOUNTER — HOSPITAL ENCOUNTER (OUTPATIENT)
Dept: RADIOLOGY | Facility: CLINIC | Age: 69
Discharge: HOME | End: 2024-02-14
Payer: MEDICARE

## 2024-02-14 ENCOUNTER — OFFICE VISIT (OUTPATIENT)
Dept: PRIMARY CARE | Facility: CLINIC | Age: 69
End: 2024-02-14
Payer: MEDICARE

## 2024-02-14 VITALS
HEART RATE: 72 BPM | WEIGHT: 102 LBS | DIASTOLIC BLOOD PRESSURE: 70 MMHG | HEIGHT: 69 IN | SYSTOLIC BLOOD PRESSURE: 110 MMHG | BODY MASS INDEX: 15.11 KG/M2

## 2024-02-14 DIAGNOSIS — J44.9 CHRONIC OBSTRUCTIVE PULMONARY DISEASE, UNSPECIFIED COPD TYPE (MULTI): ICD-10-CM

## 2024-02-14 DIAGNOSIS — R10.2 PELVIC PAIN: ICD-10-CM

## 2024-02-14 DIAGNOSIS — R93.89 ABNORMAL X-RAY: Primary | ICD-10-CM

## 2024-02-14 DIAGNOSIS — R93.89 ABNORMAL X-RAY: ICD-10-CM

## 2024-02-14 DIAGNOSIS — K86.1 CHRONIC PANCREATITIS, UNSPECIFIED PANCREATITIS TYPE (MULTI): ICD-10-CM

## 2024-02-14 DIAGNOSIS — E44.0 MALNUTRITION OF MODERATE DEGREE (MULTI): ICD-10-CM

## 2024-02-14 DIAGNOSIS — C78.7 MALIGNANT NEOPLASM METASTATIC TO LIVER (MULTI): ICD-10-CM

## 2024-02-14 DIAGNOSIS — S32.9XXS CLOSED DISPLACED FRACTURE OF PELVIS, UNSPECIFIED PART OF PELVIS, SEQUELA: Primary | ICD-10-CM

## 2024-02-14 PROCEDURE — 99214 OFFICE O/P EST MOD 30 MIN: CPT | Performed by: CLINICAL NURSE SPECIALIST

## 2024-02-14 PROCEDURE — 1125F AMNT PAIN NOTED PAIN PRSNT: CPT | Performed by: CLINICAL NURSE SPECIALIST

## 2024-02-14 PROCEDURE — 72192 CT PELVIS W/O DYE: CPT | Performed by: RADIOLOGY

## 2024-02-14 PROCEDURE — 1036F TOBACCO NON-USER: CPT | Performed by: CLINICAL NURSE SPECIALIST

## 2024-02-14 PROCEDURE — 72192 CT PELVIS W/O DYE: CPT

## 2024-02-14 PROCEDURE — 1160F RVW MEDS BY RX/DR IN RCRD: CPT | Performed by: CLINICAL NURSE SPECIALIST

## 2024-02-14 PROCEDURE — 1159F MED LIST DOCD IN RCRD: CPT | Performed by: CLINICAL NURSE SPECIALIST

## 2024-02-14 ASSESSMENT — ENCOUNTER SYMPTOMS
JOINT SWELLING: 0
APPETITE CHANGE: 0
WOUND: 0
NECK PAIN: 0
SORE THROAT: 0
NAUSEA: 0
BACK PAIN: 1
VOMITING: 0
COUGH: 0
FLANK PAIN: 0
TROUBLE SWALLOWING: 0
FATIGUE: 0
DIARRHEA: 0
SHORTNESS OF BREATH: 1
POLYDIPSIA: 0
SEIZURES: 0
CHEST TIGHTNESS: 0
MYALGIAS: 0
HEMATURIA: 0
DIZZINESS: 0
HEADACHES: 0
ARTHRALGIAS: 1
WHEEZING: 0
ABDOMINAL PAIN: 0
PALPITATIONS: 0
DYSURIA: 0
EYE PAIN: 0
PHOTOPHOBIA: 0
CONFUSION: 0
CONSTIPATION: 0
FEVER: 0
BLOOD IN STOOL: 0
UNEXPECTED WEIGHT CHANGE: 0
BRUISES/BLEEDS EASILY: 0
CHILLS: 0
SLEEP DISTURBANCE: 0
ACTIVITY CHANGE: 0

## 2024-02-14 NOTE — PROGRESS NOTES
Subjective   Patient ID: Nayely Barrera is a 68 y.o. female who presents for CT results.  HPI    Here today as a follow up from the Urgent Care.  Patient was seen at the Urgent Care on 02/07/2024, presented after a fall down stairs a few days prior. States that she fell down a few stairs and twisted, unsure how she landed. Complaints of left hip, pelvic pain and right rib pain. Pain is worse with movement. Per imaging from Clinic suspicious for subtotal fracture of the left superior pubic ramus. Recommending Pelvis CT.      Follows with Pain Management. Kamini Goldberg, has been prescribed Morphine PRN. Cancer related pain. Last OV January 2024.  Increased pain, has been working to adjust/weaning opiate medication.      Follows with Pulmonology. Former Smoker. Quit smoking in 2017. COPD. Using Albuterol PRN. Multiple lung nodules, Stable. Planning follow up imaging. Chronic issues with shortness of breath. Has been prescribed Anoro.      History of Tongue Cancer, Resection and XRT. Diagnosed in September 2020. Following with Specialist for continued monitoring.      Chronic Pancreatitis.        Review of Systems   Constitutional:  Negative for activity change, appetite change, chills, fatigue, fever and unexpected weight change.   HENT:  Negative for ear pain, hearing loss, nosebleeds, sore throat, tinnitus and trouble swallowing.    Eyes:  Negative for photophobia, pain and visual disturbance.   Respiratory:  Positive for shortness of breath. Negative for cough, chest tightness and wheezing.    Cardiovascular:  Negative for chest pain, palpitations and leg swelling.   Gastrointestinal:  Negative for abdominal pain, blood in stool, constipation, diarrhea, nausea and vomiting.   Endocrine: Negative for cold intolerance, heat intolerance, polydipsia and polyuria.   Genitourinary:  Negative for dysuria, flank pain and hematuria.   Musculoskeletal:  Positive for arthralgias and back pain. Negative for joint swelling,  myalgias and neck pain.   Skin:  Negative for pallor, rash and wound.   Allergic/Immunologic: Negative for immunocompromised state.   Neurological:  Negative for dizziness, seizures and headaches.   Hematological:  Does not bruise/bleed easily.   Psychiatric/Behavioral:  Negative for confusion and sleep disturbance.        Objective   Physical Exam  Vitals and nursing note reviewed.   Constitutional:       General: She is not in acute distress.     Appearance: Normal appearance.   HENT:      Head: Normocephalic.      Nose: Nose normal.   Eyes:      Conjunctiva/sclera: Conjunctivae normal.   Neck:      Vascular: No carotid bruit.   Cardiovascular:      Rate and Rhythm: Normal rate and regular rhythm.      Pulses: Normal pulses.      Heart sounds: Normal heart sounds.   Pulmonary:      Effort: Pulmonary effort is normal.      Breath sounds: Normal breath sounds.   Abdominal:      General: Bowel sounds are normal.      Palpations: Abdomen is soft.   Musculoskeletal:         General: Normal range of motion.      Cervical back: Normal range of motion.   Skin:     General: Skin is warm and dry.   Neurological:      Mental Status: She is alert and oriented to person, place, and time. Mental status is at baseline.   Psychiatric:         Mood and Affect: Mood normal.         Behavior: Behavior normal.         Assessment/Plan         Reviewed most recent records.      Pelvic Pain, Suspected Fracture: CT ordered. Per imaging from Clinic suspicious for subtotal fracture of the left superior pubic ramus. Recommending Pelvis CT. Will follow up pending results.   COPD, Lung Nodules: Following with Pulmonology. Albuterol PRN. Anoro.   Hyperthyroidism: Following with Endocrinology for management.   Osteoporosis: Treatment per Endocrinology.   Malnutrition, Cancer related pain: Following with Pain Management. Last OV January 2024.      Declined updated immunizations.   Declined updated screenings.  COVID Vaccine: November/December  2021.       Krya Webb, APRN-CNS 02/14/24 12:59 PM Patient was identified as a fall risk. Risk prevention instructions provided.

## 2024-02-14 NOTE — PATIENT INSTRUCTIONS

## 2024-02-15 NOTE — TELEPHONE ENCOUNTER
I called to schedule. They need to know what its for. States when they look its for a fracture in the spine(L4)  which they do not have anyone for that. If its for a Pelvic fracture they can schedule

## 2024-02-15 NOTE — TELEPHONE ENCOUNTER
----- Message from BASSAM Hickey-CNS sent at 2/14/2024  4:26 PM EST -----  Called and left patient a message to discuss results. Orthopedics referral placed. Thank you!

## 2024-02-15 NOTE — TELEPHONE ENCOUNTER
Called and discussed with patient. Are you able to schedule with Ortho and let her know appointment? Yesterday was likely [place] either location where she could be seen sooner. Thank you!

## 2024-02-19 ENCOUNTER — OFFICE VISIT (OUTPATIENT)
Dept: ORTHOPEDIC SURGERY | Facility: CLINIC | Age: 69
End: 2024-02-19
Payer: MEDICARE

## 2024-02-19 VITALS — HEIGHT: 69 IN | WEIGHT: 102 LBS | BODY MASS INDEX: 15.11 KG/M2

## 2024-02-19 DIAGNOSIS — S32.810A PELVIC RING FRACTURE, CLOSED, INITIAL ENCOUNTER (MULTI): Primary | ICD-10-CM

## 2024-02-19 DIAGNOSIS — S32.9XXS CLOSED DISPLACED FRACTURE OF PELVIS, UNSPECIFIED PART OF PELVIS, SEQUELA: ICD-10-CM

## 2024-02-19 PROCEDURE — 1160F RVW MEDS BY RX/DR IN RCRD: CPT | Performed by: ORTHOPAEDIC SURGERY

## 2024-02-19 PROCEDURE — 99204 OFFICE O/P NEW MOD 45 MIN: CPT | Performed by: ORTHOPAEDIC SURGERY

## 2024-02-19 PROCEDURE — 1125F AMNT PAIN NOTED PAIN PRSNT: CPT | Performed by: ORTHOPAEDIC SURGERY

## 2024-02-19 PROCEDURE — 1159F MED LIST DOCD IN RCRD: CPT | Performed by: ORTHOPAEDIC SURGERY

## 2024-02-19 PROCEDURE — 27197 CLSD TX PELVIC RING FX: CPT | Performed by: ORTHOPAEDIC SURGERY

## 2024-02-19 PROCEDURE — 1036F TOBACCO NON-USER: CPT | Performed by: ORTHOPAEDIC SURGERY

## 2024-02-19 ASSESSMENT — PAIN - FUNCTIONAL ASSESSMENT: PAIN_FUNCTIONAL_ASSESSMENT: 0-10

## 2024-02-19 ASSESSMENT — PAIN SCALES - GENERAL: PAINLEVEL_OUTOF10: 7

## 2024-02-19 NOTE — PROGRESS NOTES
PRIMARY CARE PHYSICIAN: Kyra Webb, APRN-CNS  REFERRING PROVIDER: No referring provider defined for this encounter.     CONSULT ORTHOPAEDIC: Hip Evaluation        ASSESSMENT & PLAN    IMPRESSION:  1.  Fracture of left pelvis superior and inferior pubic rami, closed, nondisplaced    PLAN:  Discussed with patient findings above.  Reviewed CT scan with her.  Discussed that her current fractures may be weightbearing as tolerated while using pain as a guide and may be treated nonoperatively.  Patient is overall doing well and able to and without assistive device and will continue activities while using pain as a guide.  Would recommend she follow-up in 6 weeks with repeat x-rays for reevaluation of fracture healing but in the meantime continue with conservative care.      SUBJECTIVE  CHIEF COMPLAINT:   Chief Complaint   Patient presents with    Left Hip - Pain        HPI: Nayely Barrera is a 68 y.o. patient. Nayely Barrera has had progressive problems with the left hip over the past 2 week. They do report any trauma. Slipped and fell down some stairs. They do not report any constant or progressive numbness or tingling in their legs.  She does have pain with ambulation and was originally using a cane but is now transition off and using no assistive device.    FUNCTIONAL STATUS: limited:  unable to perform activities of daily living.  Due to current injury  AMBULATORY STATUS: Househould ambulation independent without devices  PREVIOUS TREATMENTS: None  HISTORY OF SURGERY ON AFFECTED HIP(S): No   BACK PAIN REPORTED: No       REVIEW OF SYSTEMS  Constitutional: See HPI for pain assessment, No significant weight loss, recent trauma  Cardiovascular: No chest pain, shortness of breath  Respiratory: No difficulty breathing, cough  Gastrointestinal: No nausea, vomiting, diarrhea, constipation  Musculoskeletal: Noted in HPI, positive for pain, restricted motion, stiffness  Integumentary: No rashes, easy bruising, redness    Neurological: no numbness or tingling in extremities, no gait disturbances   Psychiatric: No mood changes, memory changes, social issues  Heme/Lymph: no excessive swelling, easy bruising, excessive bleeding  ENT: No hearing changes  Eyes: No vision changes    No past medical history on file.     No Known Allergies     Past Surgical History:   Procedure Laterality Date    OTHER SURGICAL HISTORY  08/24/2022    Tubal ligation    OTHER SURGICAL HISTORY  08/24/2022    Cholecystectomy    OTHER SURGICAL HISTORY  08/24/2022    Hip surgery    OTHER SURGICAL HISTORY  08/24/2022    Tongue surgery    OTHER SURGICAL HISTORY  08/24/2022    Shoulder surgery        No family history on file.     Social History     Socioeconomic History    Marital status:      Spouse name: Not on file    Number of children: Not on file    Years of education: Not on file    Highest education level: Not on file   Occupational History    Not on file   Tobacco Use    Smoking status: Never    Smokeless tobacco: Never   Vaping Use    Vaping Use: Never used   Substance and Sexual Activity    Alcohol use: Never    Drug use: Never    Sexual activity: Defer   Other Topics Concern    Not on file   Social History Narrative    Not on file     Social Determinants of Health     Financial Resource Strain: Not on file   Food Insecurity: Not on file   Transportation Needs: Not on file   Physical Activity: Not on file   Stress: Not on file   Social Connections: Not on file   Intimate Partner Violence: Not on file   Housing Stability: Not on file        CURRENT MEDICATIONS:   Current Outpatient Medications   Medication Sig Dispense Refill    albuterol 90 mcg/actuation inhaler Inhale 2 puffs every 4 hours if needed.      Anoro Ellipta 62.5-25 mcg/actuation blister with device Inhale 1 puff once daily.      droNABinol (Syndros) 5 mg/mL solution 0.5 mL EVERY 12 HOURS (route: oral) 30 mL 0    DULoxetine (Cymbalta) 60 mg DR capsule TAKE ONE CAPSULE BY MOUTH DAILY.  "PATIENT MAY OPEN CAPSULE AND SPRINKLE IN FOOD. 30 capsule 2    ergocalciferol (Vitamin D-2) 1.25 MG (48370 UT) capsule TAKE ONE CAPSULE BY MOUTH ONCE A WEEK 5 capsule 0    ibuprofen 200 mg tablet Take by mouth 2 times a day.      levothyroxine (Synthroid, Levoxyl) 75 mcg tablet Take 1 tablet (75 mcg) by mouth once daily in the morning. Take before meals. Take all by itself with water only and then wait at least 1 hour before having food or other medication. 90 tablet 3    morphine 20 mg/mL concentrated oral solution Take 0.5 mL (10 mg) by mouth 4 times a day as needed for severe pain (7 - 10). 60 mL 0    naloxone (Narcan) 4 mg/0.1 mL nasal spray Administer into affected nostril(s).       No current facility-administered medications for this visit.        OBJECTIVE    PHYSICAL EXAM      10/16/2023    11:45 AM 10/16/2023     1:00 PM 11/15/2023     1:50 PM 11/22/2023    11:40 AM 1/24/2024    12:10 PM 2/14/2024    12:51 PM 2/19/2024    10:34 AM   Vitals   Systolic 140 130  143 137 110    Diastolic 100 84  83 78 70    Heart Rate 105   63 82 72    Temp   36.8 °C (98.3 °F)       Resp    16 16     Height (in) 1.651 m (5' 5\")  1.651 m (5' 5\") 1.651 m (5' 5\") 1.753 m (5' 9\") 1.753 m (5' 9\") 1.753 m (5' 9\")   Weight (lb) 105  100.9 102.3 102.9 102 102   BMI 17.47 kg/m2  16.79 kg/m2 17.02 kg/m2 15.2 kg/m2 15.06 kg/m2 15.06 kg/m2   BSA (m2) 1.48 m2  1.45 m2 1.46 m2 1.51 m2 1.5 m2 1.5 m2   Visit Report Report Report Report Report Report Report Report      Body mass index is 15.06 kg/m².    GENERAL: A/Ox3, NAD. Appears healthy, well nourished  PSYCHIATRIC: Mood stable, appropriate memory recall  EYES: EOM intact, no scleral icterus  CARDIAC: regular rate  LUNGS: Breathing non-labored  SKIN: no erythema, rashes, or ecchymoses     MUSCULOSKELETAL:  Laterality: left Hip Exam  - ROM, Extension: Full, no flexion contracture  - Strength: Abduction 5/5, Flexion 5/5  - Palpation: Nontender along hip but does have some pain that localizes " the groin with pelvic compression  - Log roll/IR exam: Good motion, nonpainful  - EHL/PF/DF motor intact  - Gait: Antalgic to the left    NEUROVASCULAR:  - Neurovascular Status: sensation intact to light touch distally  - Capillary refill brisk at extremities, Bilateral dorsalis pedis pulse 2+        IMAGING:  CT scan of the pelvis reviewed which demonstrates a nondisplaced fracture of the left superior and inferior pubic rami fractures.  Radiographic images were personally reviewed and interpreted by me.  Radiology reports were reviewed by me as well, if readily available at the time.        Daisha Weldon DO  Attending Surgeon  Joint Replacement and Adult Reconstructive Surgery  [place], [place]

## 2024-02-19 NOTE — LETTER
February 19, 2024     Kyra Webb, BASSAM-TED  [address]  [facility] [address]    Patient: Nayely Barrera   YOB: 1955   Date of Visit: 2/19/2024       Dear Dr. Kyra Webb, BASSAM-TED:    Thank you for referring Nayely Barrera to me for evaluation. Below are my notes for this consultation.  If you have questions, please do not hesitate to call me. I look forward to following your patient along with you.       Sincerely,     Daisha Weldon, DO      CC: No Recipients  ______________________________________________________________________________________    PRIMARY CARE PHYSICIAN: LUBA Hickey  REFERRING PROVIDER: No referring provider defined for this encounter.     CONSULT ORTHOPAEDIC: Hip Evaluation        ASSESSMENT & PLAN    IMPRESSION:  1.  Fracture of left pelvis superior and inferior pubic rami, closed, nondisplaced    PLAN:  Discussed with patient findings above.  Reviewed CT scan with her.  Discussed that her current fractures may be weightbearing as tolerated while using pain as a guide and may be treated nonoperatively.  Patient is overall doing well and able to and without assistive device and will continue activities while using pain as a guide.  Would recommend she follow-up in 6 weeks with repeat x-rays for reevaluation of fracture healing but in the meantime continue with conservative care.      SUBJECTIVE  CHIEF COMPLAINT:   Chief Complaint   Patient presents with   • Left Hip - Pain        HPI: Nayely Barrera is a 68 y.o. patient. Nayely Barrera has had progressive problems with the left hip over the past 2 week. They do report any trauma. Slipped and fell down some stairs. They do not report any constant or progressive numbness or tingling in their legs.  She does have pain with ambulation and was originally using a cane but is now transition off and using no assistive device.    FUNCTIONAL STATUS: limited:  unable  to perform activities of daily living.  Due to current injury  AMBULATORY STATUS: Househould ambulation independent without devices  PREVIOUS TREATMENTS: None  HISTORY OF SURGERY ON AFFECTED HIP(S): No   BACK PAIN REPORTED: No       REVIEW OF SYSTEMS  Constitutional: See HPI for pain assessment, No significant weight loss, recent trauma  Cardiovascular: No chest pain, shortness of breath  Respiratory: No difficulty breathing, cough  Gastrointestinal: No nausea, vomiting, diarrhea, constipation  Musculoskeletal: Noted in HPI, positive for pain, restricted motion, stiffness  Integumentary: No rashes, easy bruising, redness   Neurological: no numbness or tingling in extremities, no gait disturbances   Psychiatric: No mood changes, memory changes, social issues  Heme/Lymph: no excessive swelling, easy bruising, excessive bleeding  ENT: No hearing changes  Eyes: No vision changes    No past medical history on file.     No Known Allergies     Past Surgical History:   Procedure Laterality Date   • OTHER SURGICAL HISTORY  08/24/2022    Tubal ligation   • OTHER SURGICAL HISTORY  08/24/2022    Cholecystectomy   • OTHER SURGICAL HISTORY  08/24/2022    Hip surgery   • OTHER SURGICAL HISTORY  08/24/2022    Tongue surgery   • OTHER SURGICAL HISTORY  08/24/2022    Shoulder surgery        No family history on file.     Social History     Socioeconomic History   • Marital status:      Spouse name: Not on file   • Number of children: Not on file   • Years of education: Not on file   • Highest education level: Not on file   Occupational History   • Not on file   Tobacco Use   • Smoking status: Never   • Smokeless tobacco: Never   Vaping Use   • Vaping Use: Never used   Substance and Sexual Activity   • Alcohol use: Never   • Drug use: Never   • Sexual activity: Defer   Other Topics Concern   • Not on file   Social History Narrative   • Not on file     Social Determinants of Health     Financial Resource Strain: Not on file  "  Food Insecurity: Not on file   Transportation Needs: Not on file   Physical Activity: Not on file   Stress: Not on file   Social Connections: Not on file   Intimate Partner Violence: Not on file   Housing Stability: Not on file        CURRENT MEDICATIONS:   Current Outpatient Medications   Medication Sig Dispense Refill   • albuterol 90 mcg/actuation inhaler Inhale 2 puffs every 4 hours if needed.     • Anoro Ellipta 62.5-25 mcg/actuation blister with device Inhale 1 puff once daily.     • droNABinol (Syndros) 5 mg/mL solution 0.5 mL EVERY 12 HOURS (route: oral) 30 mL 0   • DULoxetine (Cymbalta) 60 mg DR capsule TAKE ONE CAPSULE BY MOUTH DAILY. PATIENT MAY OPEN CAPSULE AND SPRINKLE IN FOOD. 30 capsule 2   • ergocalciferol (Vitamin D-2) 1.25 MG (53602 UT) capsule TAKE ONE CAPSULE BY MOUTH ONCE A WEEK 5 capsule 0   • ibuprofen 200 mg tablet Take by mouth 2 times a day.     • levothyroxine (Synthroid, Levoxyl) 75 mcg tablet Take 1 tablet (75 mcg) by mouth once daily in the morning. Take before meals. Take all by itself with water only and then wait at least 1 hour before having food or other medication. 90 tablet 3   • morphine 20 mg/mL concentrated oral solution Take 0.5 mL (10 mg) by mouth 4 times a day as needed for severe pain (7 - 10). 60 mL 0   • naloxone (Narcan) 4 mg/0.1 mL nasal spray Administer into affected nostril(s).       No current facility-administered medications for this visit.        OBJECTIVE    PHYSICAL EXAM      10/16/2023    11:45 AM 10/16/2023     1:00 PM 11/15/2023     1:50 PM 11/22/2023    11:40 AM 1/24/2024    12:10 PM 2/14/2024    12:51 PM 2/19/2024    10:34 AM   Vitals   Systolic 140 130  143 137 110    Diastolic 100 84  83 78 70    Heart Rate 105   63 82 72    Temp   36.8 °C (98.3 °F)       Resp    16 16     Height (in) 1.651 m (5' 5\")  1.651 m (5' 5\") 1.651 m (5' 5\") 1.753 m (5' 9\") 1.753 m (5' 9\") 1.753 m (5' 9\")   Weight (lb) 105  100.9 102.3 102.9 102 102   BMI 17.47 kg/m2  16.79 " kg/m2 17.02 kg/m2 15.2 kg/m2 15.06 kg/m2 15.06 kg/m2   BSA (m2) 1.48 m2  1.45 m2 1.46 m2 1.51 m2 1.5 m2 1.5 m2   Visit Report Report Report Report Report Report Report Report      Body mass index is 15.06 kg/m².    GENERAL: A/Ox3, NAD. Appears healthy, well nourished  PSYCHIATRIC: Mood stable, appropriate memory recall  EYES: EOM intact, no scleral icterus  CARDIAC: regular rate  LUNGS: Breathing non-labored  SKIN: no erythema, rashes, or ecchymoses     MUSCULOSKELETAL:  Laterality: left Hip Exam  - ROM, Extension: Full, no flexion contracture  - Strength: Abduction 5/5, Flexion 5/5  - Palpation: Nontender along hip but does have some pain that localizes the groin with pelvic compression  - Log roll/IR exam: Good motion, nonpainful  - EHL/PF/DF motor intact  - Gait: Antalgic to the left    NEUROVASCULAR:  - Neurovascular Status: sensation intact to light touch distally  - Capillary refill brisk at extremities, Bilateral dorsalis pedis pulse 2+        IMAGING:  CT scan of the pelvis reviewed which demonstrates a nondisplaced fracture of the left superior and inferior pubic rami fractures.  Radiographic images were personally reviewed and interpreted by me.  Radiology reports were reviewed by me as well, if readily available at the time.        Daisha Weldon DO  Attending Surgeon  Joint Replacement and Adult Reconstructive Surgery  [place], [place]

## 2024-03-07 DIAGNOSIS — G89.3 CANCER-RELATED PAIN: ICD-10-CM

## 2024-03-07 DIAGNOSIS — R51.9 FACIAL PAIN: ICD-10-CM

## 2024-03-07 NOTE — TELEPHONE ENCOUNTER
Pt is requesting refill of  morphine 20 mg/mL 0.5 mL 4 times a day prn pain [facility] [place]                                                         LV:   1/24/24                  NV:  4/24/24                OARRS reviewed with LFD:  2/2/24  #60 /30 days                          Pended RX to TED Monson for transmission to pharmacy.

## 2024-03-08 RX ORDER — MORPHINE SULFATE 20 MG/ML
10 SOLUTION ORAL 4 TIMES DAILY PRN
Qty: 60 ML | Refills: 0 | Status: SHIPPED | OUTPATIENT
Start: 2024-03-08 | End: 2024-04-10 | Stop reason: SDUPTHER

## 2024-03-08 NOTE — TELEPHONE ENCOUNTER
Okay to refill morphine solution 20 mg/mL patient taking 0.5 mL or 10 mg up to 4 times per day as needed for pain for total of 60 mL/month with start date 3/8/2024.

## 2024-04-02 ENCOUNTER — OFFICE VISIT (OUTPATIENT)
Dept: ORTHOPEDIC SURGERY | Facility: CLINIC | Age: 69
End: 2024-04-02
Payer: MEDICARE

## 2024-04-02 ENCOUNTER — HOSPITAL ENCOUNTER (OUTPATIENT)
Dept: RADIOLOGY | Facility: CLINIC | Age: 69
Discharge: HOME | End: 2024-04-02
Payer: MEDICARE

## 2024-04-02 VITALS — WEIGHT: 102 LBS | BODY MASS INDEX: 15.11 KG/M2 | HEIGHT: 69 IN

## 2024-04-02 DIAGNOSIS — S32.810A PELVIC RING FRACTURE, CLOSED, INITIAL ENCOUNTER (MULTI): ICD-10-CM

## 2024-04-02 PROCEDURE — 1125F AMNT PAIN NOTED PAIN PRSNT: CPT | Performed by: ORTHOPAEDIC SURGERY

## 2024-04-02 PROCEDURE — 72170 X-RAY EXAM OF PELVIS: CPT | Performed by: RADIOLOGY

## 2024-04-02 PROCEDURE — 72170 X-RAY EXAM OF PELVIS: CPT

## 2024-04-02 PROCEDURE — 1159F MED LIST DOCD IN RCRD: CPT | Performed by: ORTHOPAEDIC SURGERY

## 2024-04-02 PROCEDURE — 1160F RVW MEDS BY RX/DR IN RCRD: CPT | Performed by: ORTHOPAEDIC SURGERY

## 2024-04-02 PROCEDURE — 99024 POSTOP FOLLOW-UP VISIT: CPT | Performed by: ORTHOPAEDIC SURGERY

## 2024-04-02 ASSESSMENT — PAIN - FUNCTIONAL ASSESSMENT: PAIN_FUNCTIONAL_ASSESSMENT: 0-10

## 2024-04-02 ASSESSMENT — PAIN SCALES - GENERAL: PAINLEVEL_OUTOF10: 3

## 2024-04-02 NOTE — PROGRESS NOTES
ORTHOPEDIC FOLLOW UP      ============================  IMPRESSION/PLAN:  ============================  68 y.o. female with a Fracture of left pelvis superior and inferior pubic rami, closed, nondisplaced.    PLAN:    Reviewed current symptom presentation with patient reviewed her x-rays with her fracture appears to be healing appropriately.  She is overall ambulates no assistive device has minimal discomfort.  Allow her to progress with activities as tolerated and will follow-up as needed at this point.        Nayely Barrera presents today for follow up of the above condition. She comes in today for a follow up on the pelvic fracture on the left about 8 weeks ago now.  She is doing much better but still gets pain at night at times, and if she sits for long periods of time.  She is walking with no assistance now.     FUNCTIONAL STATUS: not limited.  AMBULATORY STATUS:  independent   HISTORY OF SURGERY ON AFFECTED HIP(S): No     Review of Systems:   Constitutional: See HPI for pain assessment, No significant weight loss, recent trauma. 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     No Known Allergies     Past Surgical History:   Procedure Laterality Date    OTHER SURGICAL HISTORY  08/24/2022    Tubal ligation    OTHER SURGICAL HISTORY  08/24/2022    Cholecystectomy    OTHER SURGICAL HISTORY  08/24/2022    Hip surgery    OTHER SURGICAL HISTORY  08/24/2022    Tongue surgery    OTHER SURGICAL HISTORY  08/24/2022    Shoulder surgery        No family history on file.     Social History     Socioeconomic History    Marital status:      Spouse name: Not on file    Number of children: Not on file    Years of education: Not on file    Highest education level: Not on file   Occupational History    Not on file   Tobacco Use    Smoking status: Never    Smokeless tobacco: Never   Vaping Use    Vaping Use: Never used   Substance and Sexual Activity    Alcohol use: Never    Drug use: Never    Sexual activity: Defer   Other Topics Concern    Not on file   Social History Narrative    Not on file     Social Determinants of Health     Financial Resource Strain: Not on file   Food Insecurity: Not on file   Transportation Needs: Not on file   Physical Activity: Not on file   Stress: Not on file   Social Connections: Not on file   Intimate Partner Violence: Not on file   Housing Stability: Not on file        CURRENT MEDICATIONS:   Current Outpatient Medications   Medication Sig Dispense Refill    albuterol 90 mcg/actuation inhaler Inhale 2 puffs every 4 hours if needed.      Anoro Ellipta 62.5-25 mcg/actuation blister with device Inhale 1 puff once daily.      droNABinol (Syndros) 5 mg/mL solution 0.5 mL EVERY 12 HOURS (route: oral) 30 mL 0    DULoxetine (Cymbalta) 60 mg DR capsule TAKE ONE CAPSULE BY MOUTH DAILY. PATIENT MAY OPEN CAPSULE AND SPRINKLE IN FOOD. 30 capsule 2    ergocalciferol (Vitamin D-2) 1.25 MG (17260 UT)  capsule TAKE ONE CAPSULE BY MOUTH ONCE A WEEK 5 capsule 0    ibuprofen 200 mg tablet Take by mouth 2 times a day.      levothyroxine (Synthroid, Levoxyl) 75 mcg tablet Take 1 tablet (75 mcg) by mouth once daily in the morning. Take before meals. Take all by itself with water only and then wait at least 1 hour before having food or other medication. 90 tablet 3    morphine 20 mg/mL concentrated oral solution Take 0.5 mL (10 mg) by mouth 4 times a day as needed for severe pain (7 - 10). 60 mL 0    naloxone (Narcan) 4 mg/0.1 mL nasal spray Administer into affected nostril(s).       No current facility-administered medications for this visit.        =================================  EXAM  =================================  GENERAL: A/Ox3, NAD. Appears healthy, well nourished  PSYCHIATRIC: Mood stable, appropriate memory recall  EYES: EOM intact, no scleral icterus  CARDIAC: regular rate  LUNGS: Breathing non-labored  SKIN: no erythema, rashes, or ecchymoses     MUSCULOSKELETAL:  Laterality: left Hip Exam  - ROM, Extension: Full, no flexion contracture  - Strength: Abduction 5/5, Flexion 5/5  - Palpation: Nontender throughout hip  - Log roll/IR exam: Good motion, nonpainful  - EHL/PF/DF motor intact  - Gait: Normal, no assistive device    NEUROVASCULAR:  - Neurovascular Status: sensation intact to light touch distally  - Capillary refill brisk at extremities, Bilateral dorsalis pedis pulse 2+     There is no height or weight on file to calculate BMI.      IMAGING: X-rays of left hip and pelvis reviewed which demonstrates healing fractures of the left superior and inferior pubic rami X-rays were personally reviewed by me.  Radiology reports were reviewed by me as well, if available at the time.        Daisha Weldon DO  Attending Surgeon  Joint Replacement and Adult Reconstructive Surgery  [place], [place]

## 2024-04-10 DIAGNOSIS — G89.3 CANCER-RELATED PAIN: ICD-10-CM

## 2024-04-10 DIAGNOSIS — R51.9 FACIAL PAIN: ICD-10-CM

## 2024-04-10 NOTE — TELEPHONE ENCOUNTER
Pt is requesting refill of   morphine 20 mg/mL - 10 mg QID  [place]                                                        LV:   1/24/24                  NV:  4/24/24                OARRS reviewed with [facility]:  3/8/24  #60/30 days                          Pended RX to [facility] for transmission to pharmacy.

## 2024-04-12 RX ORDER — MORPHINE SULFATE 20 MG/ML
10 SOLUTION ORAL 4 TIMES DAILY PRN
Qty: 60 ML | Refills: 0 | Status: SHIPPED | OUTPATIENT
Start: 2024-04-12 | End: 2024-05-13 | Stop reason: SDUPTHER

## 2024-04-12 NOTE — TELEPHONE ENCOUNTER
Okay to refill oral morphine 20 mg/mL with a dose of 10 mg up to 4 times per day as needed for pain.  Total 60 mL with start date 4/12/2024.

## 2024-04-17 ENCOUNTER — APPOINTMENT (OUTPATIENT)
Dept: PRIMARY CARE | Facility: CLINIC | Age: 69
End: 2024-04-17
Payer: MEDICARE

## 2024-04-24 ENCOUNTER — OFFICE VISIT (OUTPATIENT)
Dept: PAIN MEDICINE | Facility: HOSPITAL | Age: 69
End: 2024-04-24
Payer: MEDICARE

## 2024-04-24 VITALS
OXYGEN SATURATION: 97 % | DIASTOLIC BLOOD PRESSURE: 87 MMHG | WEIGHT: 103.4 LBS | SYSTOLIC BLOOD PRESSURE: 143 MMHG | BODY MASS INDEX: 17.23 KG/M2 | HEART RATE: 84 BPM | RESPIRATION RATE: 16 BRPM | HEIGHT: 65 IN

## 2024-04-24 DIAGNOSIS — M25.511 CHRONIC RIGHT SHOULDER PAIN: ICD-10-CM

## 2024-04-24 DIAGNOSIS — G89.29 CHRONIC RIGHT SHOULDER PAIN: ICD-10-CM

## 2024-04-24 DIAGNOSIS — R51.9 FACIAL PAIN: ICD-10-CM

## 2024-04-24 DIAGNOSIS — C02.3 CANCER OF ANTERIOR TWO-THIRDS OF TONGUE (MULTI): Primary | ICD-10-CM

## 2024-04-24 DIAGNOSIS — M25.50 POLYARTHRALGIA: ICD-10-CM

## 2024-04-24 DIAGNOSIS — G89.3 CANCER-RELATED PAIN: ICD-10-CM

## 2024-04-24 PROCEDURE — 1159F MED LIST DOCD IN RCRD: CPT | Performed by: CLINICAL NURSE SPECIALIST

## 2024-04-24 PROCEDURE — 1036F TOBACCO NON-USER: CPT | Performed by: CLINICAL NURSE SPECIALIST

## 2024-04-24 PROCEDURE — 99214 OFFICE O/P EST MOD 30 MIN: CPT | Performed by: CLINICAL NURSE SPECIALIST

## 2024-04-24 PROCEDURE — 1160F RVW MEDS BY RX/DR IN RCRD: CPT | Performed by: CLINICAL NURSE SPECIALIST

## 2024-04-24 RX ORDER — DULOXETIN HYDROCHLORIDE 60 MG/1
CAPSULE, DELAYED RELEASE ORAL
Qty: 30 CAPSULE | Refills: 2 | Status: SHIPPED | OUTPATIENT
Start: 2024-04-24

## 2024-04-24 ASSESSMENT — ENCOUNTER SYMPTOMS
OCCASIONAL FEELINGS OF UNSTEADINESS: 0
LOSS OF SENSATION IN FEET: 0
DEPRESSION: 0

## 2024-04-24 ASSESSMENT — PATIENT HEALTH QUESTIONNAIRE - PHQ9
2. FEELING DOWN, DEPRESSED OR HOPELESS: NOT AT ALL
1. LITTLE INTEREST OR PLEASURE IN DOING THINGS: NOT AT ALL
SUM OF ALL RESPONSES TO PHQ9 QUESTIONS 1 AND 2: 0

## 2024-04-24 NOTE — PROGRESS NOTES
Subjective   Patient ID: Nayely Barrera is a 68 y.o. female who presents for chronic oropharyngeal pain related to cancer, right shoulder pain and low back pain      HPI    68-year-old female with history of oropharyngeal cancer s/p resection and radiation treatment resulting in chronic dysphagia and mouth pain/throat pain as well as chronic low back pain, polyarticular pain presents for follow-up.  Polyarticular pain most bothersome to her right shoulder.  She had a previous fall which resulted in right-sided rib pain status post fracture.  Also recent diagnosis of osteoporosis and is currently being treated with Reclast.  Maintained on immediate release liquid morphine due to difficulty swallowing status post radiation treatment.  Also continues to utilize Marinol for anorexia and duloxetine.  Patient presents at today's office visit with chronic mouth/throat pain related to oropharyngeal cancer, chronic low back pain and polyarticular pain.  Polyarticular pain is most bothersome to her right shoulder and currently right rib status post recent fall.  Patient slipped on some steps and fell fracturing her pelvis and right rib.  Pain is aching, burning and throbbing.  She continues to experience chronic numbness and tingling to right lower extremity unchanged from previous exam.  She denies any increasing weakness or changes in bowel/bladder function.  She denies any problems with balance.  She states that falls were related to tripping/stumbling.  She notes an increase in her mouth/throat pain when she eats/swallows.  She also continues to experience tongue burning.  She has noted an increase in difficulty swallowing and feels that she has to flex her head to promote swallowing.  Continues to manage her pain with MS IR oral solution 10 mg up to 4 times per day as needed.  She tries to limit her pain medication to 2-3 times per day on most days.  She does feel that she receives relief with the addition of  "duloxetine.  She continues to use her Marinol to stimulate her appetite.  Continues to struggle with her weight.  Continues home stretches and exercises consistently.  She is trying to work on increasing her strength and endurance.    Location of Pain: Patient returns to the office for interval re-evaluation of \"right shoulder pain that radiates down right arm to elbow and up neck, low right back pain/right hip radiates to the knee, mouth pain from cancer, pelvic pain due to fx around February\" stated pain complaints.        Pain Score: 4/10     Treatment: Morphine Sulfate Oral Solution IR 0.5 ml (10mg) up to 4 times a day   Medication Count: 28ml in one bottle and 30ml left in other bottle  Fill Date: 4/12/24 for full unopened bottle and FD for 28 ml bottle is 12/30/23  Last Dose:  4/24/2 1 dose and take up to 4 doses a day but usually keeps it at 3  Efficacy: 50% for 4hrs  Side Effects: Denies     Narcan: Pt brought unopened Narcan to today's visit. EXP. 6/2024     Other treatment plans-medications/neuromodulators: Duloxetine 60mg-needs refill     Injections and/or Procedures: Denies     Pregnant: n/a     Pt sts, \"I'm satisfied with my current plan of care\"  OARRS:  Kamini Sawant, APRN-CNP, APRN-CNS on 4/24/2024 12:36 PM  I have personally reviewed the OARRS report for Nayely Barrera. I have considered the risks of abuse, dependence, addiction and diversion    Is the patient prescribed a combination of a benzodiazepine and opioid?  No    Last Urine Drug Screen / ordered today: No  Recent Results (from the past 8760 hour(s))   Confirmation Opiate/Opioid/Benzo Prescription Compliance    Collection Time: 01/24/24  2:44 PM   Result Value Ref Range    Clonazepam <25 <25 ng/mL    7-Aminoclonazepam <25 <25 ng/mL    Alprazolam <25 <25 ng/mL    Alpha-Hydroxyalprazolam <25 <25 ng/mL    Midazolam <25 <25 ng/mL    Alpha-Hydroxymidazolam <25 <25 ng/mL    Chlordiazepoxide <25 <25 ng/mL    Diazepam <25 <25 ng/mL    " Nordiazepam <25 <25 ng/mL    Temazepam <25 <25 ng/mL    Oxazepam <25 <25 ng/mL    Lorazepam <25 <25 ng/mL    Methadone <25 <25 ng/mL    EDDP <25 <25 ng/mL    6-Acetylmorphine <25 <25 ng/mL    Codeine <50 <50 ng/mL    Hydrocodone <25 <25 ng/mL    Hydromorphone 73 (H) <25 ng/mL    Morphine  >2,500 (H) <50 ng/mL    Norhydrocodone <25 <25 ng/mL    Noroxycodone <25 <25 ng/mL    Oxycodone <25 <25 ng/mL    Oxymorphone <25 <25 ng/mL    Fentanyl <2.5 <2.5 ng/mL    Norfentanyl <2.5 <2.5 ng/mL    Tramadol <50 <50 ng/mL    O-Desmethyltramadol <50 <50 ng/mL    Zolpidem <25 <25 ng/mL    Zolpidem Metabolite (ZCA) <25 <25 ng/mL   Screen Opiate/Opioid/Benzo Prescription Compliance    Collection Time: 01/24/24  2:44 PM   Result Value Ref Range    Creatinine, Urine Random 23.7 20.0 - 320.0 mg/dL    Amphetamine Screen, Urine Presumptive Negative Presumptive Negative    Barbiturate Screen, Urine Presumptive Negative Presumptive Negative    Cannabinoid Screen, Urine Presumptive Negative Presumptive Negative    Cocaine Metabolite Screen, Urine Presumptive Negative Presumptive Negative    PCP Screen, Urine Presumptive Negative Presumptive Negative   Tapentadol Confirmation, Urine    Collection Time: 01/24/24  2:44 PM   Result Value Ref Range    Tapentadol <25 <25 ng/mL    N-Desmethyltapentadol <25 <25 ng/mL   Buprenorphine Confirm,Urine    Collection Time: 01/24/24  2:44 PM   Result Value Ref Range    BUPRENORPHINE GLUC, URINE <5 ng/mL    BUPRENORPHINE ,URINE <2 ng/mL    NALOXONE, URINE <100 ng/mL    NORBUPRENORPHINE GLUC,URINE <5 ng/mL    NORBUPRENORPHINE, URINE <2 ng/mL     Results are as expected.     Controlled Substance Agreement: 6/12/23  Date of the Last Agreement: 6/12/23  Reviewed Controlled Substance Agreement including but not limited to the benefits, risks, and alternatives to treatment with a Controlled Substance medication(s).    Monitoring and compliance: 1/24/24    ORT: 6/12/23    PDUQ: 1/24/24    Office Agreement:  6/12/23      Review of Systems    ROS:   General: No fevers, chills, weight loss  Skin: Negative for lesions  Eyes: No acute vision changes  Ears: No vertigo  Nose, mouth, throat: Positive for difficulty swallowing, positive for oropharyngeal pain   Respiratory: No cough, shortness of breath, cyanosis  Cardiovascular: Negative for chest pain syncope or palpitation  Gastrointestinal: No constipation, nausea, vomiting  Neurological: Negative for headache, positive for: Paresthesia  Psychological: Negative for severe or debilitating anxiety, depression. Negative memory loss  Musculoskeletal: Positive for arthralgia, myalgia and pain  Endocrine: Negative for weight gain, appetite changes, excessive sweating  Allergy/immune: Negative    All 13 systems were reviewed and are within normal levels except as noted or in the history of present illness.  Positive or pertinent negative responses are noted or were in the history of present illness. As noted, the patient denies significant or impairing weakness in the bilateral upper and lower extremities, medication induced constipation, and bowel or bladder incontinence.     Current Outpatient Medications:     albuterol 90 mcg/actuation inhaler, Inhale 2 puffs every 4 hours if needed., Disp: , Rfl:     Anoro Ellipta 62.5-25 mcg/actuation blister with device, Inhale 1 puff once daily., Disp: , Rfl:     droNABinol (Syndros) 5 mg/mL solution, 0.5 mL EVERY 12 HOURS (route: oral), Disp: 30 mL, Rfl: 0    ergocalciferol (Vitamin D-2) 1.25 MG (76346 UT) capsule, TAKE ONE CAPSULE BY MOUTH ONCE A WEEK, Disp: 5 capsule, Rfl: 0    ibuprofen 200 mg tablet, Take by mouth 2 times a day., Disp: , Rfl:     levothyroxine (Synthroid, Levoxyl) 75 mcg tablet, Take 1 tablet (75 mcg) by mouth once daily in the morning. Take before meals. Take all by itself with water only and then wait at least 1 hour before having food or other medication., Disp: 90 tablet, Rfl: 3    morphine 20 mg/mL concentrated  "oral solution, Take 0.5 mL (10 mg) by mouth 4 times a day as needed for severe pain (7 - 10)., Disp: 60 mL, Rfl: 0    naloxone (Narcan) 4 mg/0.1 mL nasal spray, Administer into affected nostril(s)., Disp: , Rfl:     DULoxetine (Cymbalta) 60 mg DR capsule, TAKE ONE CAPSULE BY MOUTH DAILY. PATIENT MAY OPEN CAPSULE AND SPRINKLE IN FOOD., Disp: 30 capsule, Rfl: 2     No past medical history on file.     Past Surgical History:   Procedure Laterality Date    OTHER SURGICAL HISTORY  08/24/2022    Tubal ligation    OTHER SURGICAL HISTORY  08/24/2022    Cholecystectomy    OTHER SURGICAL HISTORY  08/24/2022    Hip surgery    OTHER SURGICAL HISTORY  08/24/2022    Tongue surgery    OTHER SURGICAL HISTORY  08/24/2022    Shoulder surgery        No family history on file.     No Known Allergies     Objective     Visit Vitals  /87   Pulse 84   Resp 16   Ht 1.651 m (5' 5\")   Wt 46.9 kg (103 lb 6.4 oz)   SpO2 97%   BMI 17.21 kg/m²   Smoking Status Never   BSA 1.47 m²        Physical Exam    PE:  General: Frail/thin elderly female presents in no acute distress.  The patient demonstrates no pain behavior, symptom magnification or overt drug-seeking behavior.  Eye: Pupils appropriate for room lighting  Neck/thyroid: No obvious goiter or enlargement of neck noted  Respiratory exam: Normal respiratory effort, unlabored respiration. No accessory muscle use noted  Cardiac exam: Bilateral radial pulses intact  Abdominal: Nondistended  Spine, lumbar: The patient is able to rise from a seated to standing position without hesitancy, push off, or delay. Gait slow and deliberate.  Forward leaning posture.  Tenderness to paraspinous musculature lower lumbar spine right greater than left.  Flexion intact with extension limited.  Tenderness over right ribs.   Ortho: Right shoulder: Pain with active/passive range of motion right shoulder.  Chronic elevation of right shoulder.  Myofascial tender points/muscle tightness right upper trapezius " muscles.  Neurologic exam: Muscle strength is antigravity in all 4 extremities.  Equal muscle strength bilateral lower extremities 5/5.  Psychiatric exam: Judgment and insight normal, affect normal, speech is fluent, affect appropriate, demonstrating no signs of hypersomnolence, sedation, or confusion        Assessment/Plan   Problem List Items Addressed This Visit             ICD-10-CM    Cancer of anterior two-thirds of tongue (Multi) - Primary C02.3    Cancer-related pain G89.3     68 year-old female with history of oropharyngeal cancer resulting in chronic cancer related pain as well as dysphagia. Patient also has chronic low back pain and polyarticular pain.  Right shoulder pain and low back pain remains stable and unchanged from previous exam.  Patient noted improvement in right rib pain after recent fall fracturing her ribs.  Unfortunately patient had a second fall after falling down several steps resulting in a pelvic fracture and additional right rib fracture.  She states that she currently is recovering from this fall with improvement in pain.  She denies any difficulty breathing.  Patient continues to struggle with anorexia/weight loss related to swallowing difficulties and persistent oropharyngeal pain.  She is having more difficulty swallowing which requires her to flex her head forward in order to facilitate swallowing.  Advised patient to follow-up with her oncologist and reports this new symptom.  She continues to manage her pain with MS IR oral solution 10 mg up to 4 times per day, duloxetine 60 mg daily and Marinol 0.5 mL every 12 hours for appetite stimulation.  She tries to limit her MS IR to 2-3 times per day on most days and uses the fourth dose only when pain is more intense.  She does feel that the addition of duloxetine has been helpful and provides additional pain relief.  She continues to utilize Marinol for appetite stimulation.  Continues to struggle with her weight.  Plan reviewed with  patient at today's visit.      -Continue MS IR oral solution 10 mg every 6 hours as needed for pain.  We will continue this medication at this time as the patient feels it provides significant relief without adverse effects.  The patient tries to limit her use of this medication taking 2-3 times per day on most days.  -Continue duloxetine 60 mg daily.  Patient is able to open capsules and sprinkle in food. Currently tolerating this medication without adverse effects.  -Continue Marinol for appetite stimulation.  Patient will continue to work on weight gain.  -Patient will continue home  exercises consistently 5 to 6 days/week targeting increasing strength to lower extremities.  -Follow-up with oncology for increasing dysphagia/swallowing difficulty.  -Patient will follow-up in 3 months or sooner if needed.    MEDICAL DECISION MAKING:    My impressions and treatment recommendations were discussed in detail with the patient, who verbalized understanding and had no further questions prior to discharge. Given the patient's report of reduced pain and improved functional ability without adverse effects,  it is reasonable to continue MS IR oral solution 10 mg every 6 hours as needed.  The terms of the opioid agreement as well as the potential risks and adverse effects of the patient's medication regimen were discussed in detail. This includes if applicable due to dosage of medication permission to discuss and coordinate care with other treatment providers relevant to the patients condition. The patient verbalized understanding.    Treatment goals were discussed in detail with the patient.  These goals include reduction of pain levels, improved levels of functioning, avoidance of medication side effect and lowest medication dose possible to achieve  these goals.  The patient was in full agreement with these goals.  Also discussed is the understanding that pain may not be eliminated by medication but that the goal of a  better sustaining life through use of medication is appropriate.  Lifestyle modifications including weight management, stretching, diet, exercise and smoking are addressed at each office visit.  The patient provided a urine drug screen for routine monitoring and compliance.  This test may be given as frequently as every month based on the patient's individual opiate risk stratification and prescriber concerns for any aberrancies.  This test is indicated given the use of controlled substances for the patient's medical condition.  Unless otherwise noted the prior (s) urine drug testing results were consistent with prescribed medications.  There is no evidence of illicit drug use or additional medications ingested.     Risks and side effects of chronic opioid therapy including but not limited to tolerance, dependence, constipation, hyperalgesia, cognitive side effects, addiction and possible death due to overuse and or misuse were discussed. I also discussed that such medications when co-administered with other sedative agents including but not limited to alcohol, benzodiazepines, sedative hypnotics and illegal drugs could pose life threatening consequences including death.  I also explained the impact that the administration of such medication has on a patient with obstructive sleep apnea and continued recommendations for use of apnea devices if ordered are prescribed by other physicians.  In order to effectively and safely treat the pain, I also emphasized the importance of compliance with the treatment plan as well as compliance with the terms of the opioid agreement which was reviewed in detail. I explained the importance of being responsible with the medications and to take these only as prescribed, never in excess and never for reasons other than pain reduction. The patient was counseled on keeping the medications safe and locked away from children and other adults as well as disposal methods and options. The  patient understood the risks and instructions.      I also discussed with the patient in detail that based on the clinical response to the opioid medications and improvements of activities of daily living, sleep, and work performance in light of compliance with the treatment plan we can continue this form of therapy for the above chronic  pain.  The goal and rationale used for current treatment with chronic opioid medication is to control the pain and alleviate disability induced by the chronic pain condition noted above after failures of other non-opioid and nonpharmacological modalities to treat the chronic pain and the symptoms associated with have failed.  The patient understood the goals in terms of the above treatment plan and had no further questions prior to leaving the office today.    Given the patient's total MED, general use of daily opiates, or other coadministered medications in various classes the patient was offered a prescription for Narcan.  I instructed the patient that Narcan is for emergency use only and is worse suspected or known opiate overdose.  Call 911 prior to administration of this medication to activate the emergency response team.  It is imperative to fill this medication in order to demonstrate understanding of the gravity of possible side effects including respiratory depression and risk of overdose of this opiate load or medication combination.  As such patients will be required to bring Narcan prescriptions to follow-up appointments as part of the compliance with continued opiate care.    Disclaimer: This note was transcribed using an audio transcription device.  As such, minor errors may be present with regard to spelling, punctuation, and inadvertent word insertion.  Please disregard such errors.      -  -         Relevant Medications    DULoxetine (Cymbalta) 60 mg DR capsule    Facial pain R51.9    Relevant Medications    DULoxetine (Cymbalta) 60 mg DR capsule    Polyarthralgia  M25.50    Relevant Medications    DULoxetine (Cymbalta) 60 mg DR capsule    Right shoulder pain M25.511

## 2024-04-24 NOTE — ASSESSMENT & PLAN NOTE
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 Patient also has chronic low back pain and polyarticular pain.  Right shoulder pain and low back pain remains stable and unchanged from previous exam.  Patient noted improvement in right rib pain after recent fall fracturing her ribs.  Unfortunately patient had a second fall after falling down several steps resulting in a pelvic fracture and additional right rib fracture.  She states that she currently is recovering from this fall with improvement in pain.  She denies any difficulty breathing.  Patient continues to struggle with anorexia/weight loss related to swallowing difficulties and persistent oropharyngeal pain.  She is having more difficulty swallowing which requires her to flex her head forward in order to facilitate swallowing.  Advised patient to follow-up with her oncologist and reports this new symptom.  She continues to manage her pain with MS IR oral solution 10 mg up to 4 times per day, duloxetine 60 mg daily and Marinol 0.5 mL every 12 hours for appetite stimulation.  She tries to limit her MS IR to 2-3 times per day on most days and uses the fourth dose only when pain is more intense.  She does feel that the addition of duloxetine has been helpful and provides additional pain relief.  She continues to utilize Marinol for appetite stimulation.  Continues to struggle with her weight.  Plan reviewed with patient at today's visit.      -Continue MS IR oral solution 10 mg every 6 hours as needed for pain.  We will continue this medication at this time as the patient feels it provides significant relief without adverse effects.  The patient tries to limit her use of this medication taking 2-3 times per day on most days.  -Continue duloxetine 60 mg daily.  Patient is able to open capsules and sprinkle in food. Currently tolerating this medication without adverse effects.  -Continue Marinol  for appetite stimulation.  Patient will continue to work on weight gain.  -Patient will continue home  exercises consistently 5 to 6 days/week targeting increasing strength to lower extremities.  -Follow-up with oncology for increasing dysphagia/swallowing difficulty.  -Patient will follow-up in 3 months or sooner if needed.    MEDICAL DECISION MAKING:    My impressions and treatment recommendations were discussed in detail with the patient, who verbalized understanding and had no further questions prior to discharge. Given the patient's report of reduced pain and improved functional ability without adverse effects,  it is reasonable to continue MS IR oral solution 10 mg every 6 hours as needed.  The terms of the opioid agreement as well as the potential risks and adverse effects of the patient's medication regimen were discussed in detail. This includes if applicable due to dosage of medication permission to discuss and coordinate care with other treatment providers relevant to the patients condition. The patient verbalized understanding.    Treatment goals were discussed in detail with the patient.  These goals include reduction of pain levels, improved levels of functioning, avoidance of medication side effect and lowest medication dose possible to achieve  these goals.  The patient was in full agreement with these goals.  Also discussed is the understanding that pain may not be eliminated by medication but that the goal of a better sustaining life through use of medication is appropriate.  Lifestyle modifications including weight management, stretching, diet, exercise and smoking are addressed at each office visit.  The patient provided a urine drug screen for routine monitoring and compliance.  This test may be given as frequently as every month based on the patient's individual opiate risk stratification and prescriber concerns for any aberrancies.  This test is indicated given the use of controlled substances  for the patient's medical condition.  Unless otherwise noted the prior (s) urine drug testing results were consistent with prescribed medications.  There is no evidence of illicit drug use or additional medications ingested.     Risks and side effects of chronic opioid therapy including but not limited to tolerance, dependence, constipation, hyperalgesia, cognitive side effects, addiction and possible death due to overuse and or misuse were discussed. I also discussed that such medications when co-administered with other sedative agents including but not limited to alcohol, benzodiazepines, sedative hypnotics and illegal drugs could pose life threatening consequences including death.  I also explained the impact that the administration of such medication has on a patient with obstructive sleep apnea and continued recommendations for use of apnea devices if ordered are prescribed by other physicians.  In order to effectively and safely treat the pain, I also emphasized the importance of compliance with the treatment plan as well as compliance with the terms of the opioid agreement which was reviewed in detail. I explained the importance of being responsible with the medications and to take these only as prescribed, never in excess and never for reasons other than pain reduction. The patient was counseled on keeping the medications safe and locked away from children and other adults as well as disposal methods and options. The patient understood the risks and instructions.      I also discussed with the patient in detail that based on the clinical response to the opioid medications and improvements of activities of daily living, sleep, and work performance in light of compliance with the treatment plan we can continue this form of therapy for the above chronic  pain.  The goal and rationale used for current treatment with chronic opioid medication is to control the pain and alleviate disability induced by the chronic  pain condition noted above after failures of other non-opioid and nonpharmacological modalities to treat the chronic pain and the symptoms associated with have failed.  The patient understood the goals in terms of the above treatment plan and had no further questions prior to leaving the office today.    Given the patient's total MED, general use of daily opiates, or other coadministered medications in various classes the patient was offered a prescription for Narcan.  I instructed the patient that Narcan is for emergency use only and is worse suspected or known opiate overdose.  Call 911 prior to administration of this medication to activate the emergency response team.  It is imperative to fill this medication in order to demonstrate understanding of the gravity of possible side effects including respiratory depression and risk of overdose of this opiate load or medication combination.  As such patients will be required to bring Narcan prescriptions to follow-up appointments as part of the compliance with continued opiate care.    Disclaimer: This note was transcribed using an audio transcription device.  As such, minor errors may be present with regard to spelling, punctuation, and inadvertent word insertion.  Please disregard such errors.      -  -

## 2024-05-13 DIAGNOSIS — G89.3 CANCER-RELATED PAIN: ICD-10-CM

## 2024-05-13 DIAGNOSIS — R51.9 FACIAL PAIN: ICD-10-CM

## 2024-05-13 RX ORDER — MORPHINE SULFATE 20 MG/ML
10 SOLUTION ORAL 4 TIMES DAILY PRN
Qty: 60 ML | Refills: 0 | Status: SHIPPED | OUTPATIENT
Start: 2024-05-13 | End: 2024-06-12

## 2024-05-13 NOTE — TELEPHONE ENCOUNTER
Okay to refill oral morphine 10 mg every 6 hours as needed for pain.  100 mg per 5 mL's with patient taking 0.5 mL every 6 hours as needed.

## 2024-05-13 NOTE — TELEPHONE ENCOUNTER
Pt is requesting refill of   Morphine 0.5 mL (10 mg) po QID  [facility]                                                        LV: 4/24/24                  NV: 7/24/24                 OARRS reviewed with LFD:  4/12/24  #60/30 days                          Pended RX to [facility] for transmission to pharmacy.

## 2024-05-15 ENCOUNTER — APPOINTMENT (OUTPATIENT)
Dept: OTOLARYNGOLOGY | Facility: CLINIC | Age: 69
End: 2024-05-15
Payer: MEDICARE

## 2024-06-04 NOTE — PROGRESS NOTES
Provider Impressions     Status post surgery and radiation therapy for an anterior tongue cancer. I cannot appreciate any evidence of tumor recurrence.      Multiple pulmonary nodules which have been stable for years.  This is most likely benign.    Dysphagia. The main issue seems to be dryness. This seems to be stable.    Hypothyroidism.  She has been on Synthroid.  This is being managed at home.    I will see her in 6 months.        Chief Complaint     Follow-up status post surgery for the management of a tongue cancer      History of Present Illness    This lady was seen in October 2020 at the request of a local colleague. She was found to have a tongue cancer. On November 3, 2020 she underwent surgery. The margins at the time of the surgery were described as being negative. There was no evidence of any metastatic disease. On final pathology there was an area with severe dysplasia. She was presented at our tumor board and it was felt that she should undergo radiation. This was completed in late February 2021. The patient had significant discomfort and could not complete the entire treatment. She missed the last week. She stopped smoking in 2018. She had a TSH level done in January 2024 which was elevated.   She is on thyroid medication.  This is being done at home.  She had a CT scan of the chest done in August 2023. It did not show any significant changes from previously. It did show multiple subcentimeter nodules.  This is not different than my prior scans.    Physical Exam    Examination of the oral cavity and oropharynx shows good healing of the surgical site. There is no evidence of any suspicious mucosal lesions. There is good tongue mobility. There is good mandibular excursion. Palpation of the parotid, neck, thyroid feel fails to show any worrisome masses or adenopathies.      A flexible laryngoscopy was carried out. Under topical Xylocaine and John-Synephrine the scope was introduced through the nostril.  The nasopharynx, base of tongue, hypopharynx, and larynx are visualized. The vocal cords are normally mobile. There is no pooling of secretions in the piriform sinuses. There is no evidence of any mucosal lesions.

## 2024-06-05 ENCOUNTER — OFFICE VISIT (OUTPATIENT)
Dept: OTOLARYNGOLOGY | Facility: CLINIC | Age: 69
End: 2024-06-05
Payer: MEDICARE

## 2024-06-05 VITALS — TEMPERATURE: 98 F | WEIGHT: 105.9 LBS | HEIGHT: 65 IN | BODY MASS INDEX: 17.65 KG/M2

## 2024-06-05 DIAGNOSIS — E03.9 ACQUIRED HYPOTHYROIDISM: ICD-10-CM

## 2024-06-05 DIAGNOSIS — C02.3 CANCER OF ANTERIOR TWO-THIRDS OF TONGUE (MULTI): Primary | ICD-10-CM

## 2024-06-05 DIAGNOSIS — R13.12 DYSPHAGIA, OROPHARYNGEAL PHASE: ICD-10-CM

## 2024-06-05 PROCEDURE — 31575 DIAGNOSTIC LARYNGOSCOPY: CPT | Performed by: OTOLARYNGOLOGY

## 2024-06-05 PROCEDURE — 1160F RVW MEDS BY RX/DR IN RCRD: CPT | Performed by: OTOLARYNGOLOGY

## 2024-06-05 PROCEDURE — 99213 OFFICE O/P EST LOW 20 MIN: CPT | Performed by: OTOLARYNGOLOGY

## 2024-06-05 PROCEDURE — 1159F MED LIST DOCD IN RCRD: CPT | Performed by: OTOLARYNGOLOGY

## 2024-06-05 PROCEDURE — 1036F TOBACCO NON-USER: CPT | Performed by: OTOLARYNGOLOGY

## 2024-06-05 ASSESSMENT — PATIENT HEALTH QUESTIONNAIRE - PHQ9
1. LITTLE INTEREST OR PLEASURE IN DOING THINGS: NOT AT ALL
SUM OF ALL RESPONSES TO PHQ9 QUESTIONS 1 AND 2: 0
2. FEELING DOWN, DEPRESSED OR HOPELESS: NOT AT ALL

## 2024-06-13 DIAGNOSIS — G89.3 CANCER-RELATED PAIN: ICD-10-CM

## 2024-06-13 DIAGNOSIS — R51.9 FACIAL PAIN: ICD-10-CM

## 2024-06-13 NOTE — TELEPHONE ENCOUNTER
Pt is requesting refill of morphine 20 mg/mL - [facility]                                                           LV:   4/24/24                 NV:  7/24/24                OARRS reviewed with LFD:  5/13/24 #60/30 days                          Pended RX to [facility] for transmission to pharmacy.

## 2024-06-14 RX ORDER — MORPHINE SULFATE 20 MG/ML
10 SOLUTION ORAL 4 TIMES DAILY PRN
Qty: 60 ML | Refills: 0 | Status: SHIPPED | OUTPATIENT
Start: 2024-06-14 | End: 2024-07-14

## 2024-06-14 NOTE — TELEPHONE ENCOUNTER
Okay to refill morphine oral solution 20 mg/mL with patient taking 10 mg or 0.5 mL's every 6 hours as needed for pain.

## 2024-07-15 DIAGNOSIS — R51.9 FACIAL PAIN: ICD-10-CM

## 2024-07-15 DIAGNOSIS — G89.3 CANCER-RELATED PAIN: ICD-10-CM

## 2024-07-16 RX ORDER — MORPHINE SULFATE 20 MG/ML
10 SOLUTION ORAL 4 TIMES DAILY PRN
Qty: 60 ML | Refills: 0 | Status: SHIPPED | OUTPATIENT
Start: 2024-07-16 | End: 2024-08-15

## 2024-07-16 NOTE — TELEPHONE ENCOUNTER
Okay to refill liquid morphine 20 mg/mL with dose 0.5 mL or 10 mg every 6 hours as needed for pain.

## 2024-07-24 ENCOUNTER — OFFICE VISIT (OUTPATIENT)
Dept: PAIN MEDICINE | Facility: HOSPITAL | Age: 69
End: 2024-07-24
Payer: MEDICARE

## 2024-07-24 VITALS
HEART RATE: 81 BPM | DIASTOLIC BLOOD PRESSURE: 80 MMHG | SYSTOLIC BLOOD PRESSURE: 128 MMHG | HEIGHT: 65 IN | OXYGEN SATURATION: 96 % | RESPIRATION RATE: 16 BRPM | BODY MASS INDEX: 18.53 KG/M2 | WEIGHT: 111.2 LBS

## 2024-07-24 DIAGNOSIS — M54.41 CHRONIC RIGHT-SIDED LOW BACK PAIN WITH RIGHT-SIDED SCIATICA: ICD-10-CM

## 2024-07-24 DIAGNOSIS — Z79.891 LONG TERM PRESCRIPTION OPIATE USE: Primary | ICD-10-CM

## 2024-07-24 DIAGNOSIS — M25.511 CHRONIC RIGHT SHOULDER PAIN: ICD-10-CM

## 2024-07-24 DIAGNOSIS — G89.29 CHRONIC RIGHT-SIDED LOW BACK PAIN WITH RIGHT-SIDED SCIATICA: ICD-10-CM

## 2024-07-24 DIAGNOSIS — G89.29 CHRONIC RIGHT SHOULDER PAIN: ICD-10-CM

## 2024-07-24 DIAGNOSIS — G89.3 CANCER-RELATED PAIN: ICD-10-CM

## 2024-07-24 DIAGNOSIS — M25.50 POLYARTHRALGIA: ICD-10-CM

## 2024-07-24 DIAGNOSIS — R51.9 FACIAL PAIN: ICD-10-CM

## 2024-07-24 LAB
AMPHETAMINES UR QL SCN: ABNORMAL
BARBITURATES UR QL SCN: ABNORMAL
BZE UR QL SCN: ABNORMAL
CANNABINOIDS UR QL SCN: ABNORMAL
CREAT UR-MCNC: 14.2 MG/DL (ref 20–320)
ETHANOL ?TM UR-MCNC: <10 MG/DL
PCP UR QL SCN: ABNORMAL
SP GR UR STRIP.AUTO: 1

## 2024-07-24 PROCEDURE — 80365 DRUG SCREENING OXYCODONE: CPT | Mod: PORLAB | Performed by: CLINICAL NURSE SPECIALIST

## 2024-07-24 PROCEDURE — 81003 URINALYSIS AUTO W/O SCOPE: CPT | Mod: PORLAB | Performed by: CLINICAL NURSE SPECIALIST

## 2024-07-24 PROCEDURE — 1036F TOBACCO NON-USER: CPT | Performed by: CLINICAL NURSE SPECIALIST

## 2024-07-24 PROCEDURE — 1160F RVW MEDS BY RX/DR IN RCRD: CPT | Performed by: CLINICAL NURSE SPECIALIST

## 2024-07-24 PROCEDURE — 99214 OFFICE O/P EST MOD 30 MIN: CPT | Performed by: CLINICAL NURSE SPECIALIST

## 2024-07-24 PROCEDURE — 82570 ASSAY OF URINE CREATININE: CPT | Mod: PORLAB | Performed by: CLINICAL NURSE SPECIALIST

## 2024-07-24 PROCEDURE — 80372 DRUG SCREENING TAPENTADOL: CPT | Mod: PORLAB | Performed by: CLINICAL NURSE SPECIALIST

## 2024-07-24 PROCEDURE — 1159F MED LIST DOCD IN RCRD: CPT | Performed by: CLINICAL NURSE SPECIALIST

## 2024-07-24 PROCEDURE — 80348 DRUG SCREENING BUPRENORPHINE: CPT | Performed by: CLINICAL NURSE SPECIALIST

## 2024-07-24 PROCEDURE — 80307 DRUG TEST PRSMV CHEM ANLYZR: CPT | Mod: PORLAB | Performed by: CLINICAL NURSE SPECIALIST

## 2024-07-24 PROCEDURE — 3008F BODY MASS INDEX DOCD: CPT | Performed by: CLINICAL NURSE SPECIALIST

## 2024-07-24 RX ORDER — DULOXETIN HYDROCHLORIDE 60 MG/1
CAPSULE, DELAYED RELEASE ORAL
Qty: 30 CAPSULE | Refills: 2 | Status: SHIPPED | OUTPATIENT
Start: 2024-07-24

## 2024-07-24 ASSESSMENT — ENCOUNTER SYMPTOMS
OCCASIONAL FEELINGS OF UNSTEADINESS: 0
DEPRESSION: 0
LOSS OF SENSATION IN FEET: 0

## 2024-07-24 ASSESSMENT — LIFESTYLE VARIABLES: TOTAL SCORE: 0

## 2024-07-24 ASSESSMENT — COLUMBIA-SUICIDE SEVERITY RATING SCALE - C-SSRS
6. HAVE YOU EVER DONE ANYTHING, STARTED TO DO ANYTHING, OR PREPARED TO DO ANYTHING TO END YOUR LIFE?: NO
1. IN THE PAST MONTH, HAVE YOU WISHED YOU WERE DEAD OR WISHED YOU COULD GO TO SLEEP AND NOT WAKE UP?: NO
2. HAVE YOU ACTUALLY HAD ANY THOUGHTS OF KILLING YOURSELF?: NO

## 2024-07-24 NOTE — PROGRESS NOTES
Subjective   Patient ID: aNyely Barrera is a 68 y.o. female who presents for chronic oropharyngeal pain related to cancer, right shoulder pain and low back pain    HPI  68-year-old female with history of oropharyngeal cancer s/p resection and radiation treatment resulting in chronic dysphagia and mouth pain/throat pain as well as chronic low back pain, polyarticular pain presents for follow-up.  Polyarticular pain most bothersome to her right shoulder.  She had a previous fall which resulted in right-sided rib pain status post fracture.  Also recent diagnosis of osteoporosis and is currently being treated with Reclast.  Maintained on immediate release liquid morphine due to difficulty swallowing status post radiation treatment.  Also continues to utilize Marinol for anorexia and duloxetine.  Patient presents at today's office visit chronic mouth/throat pain related to oropharyngeal cancer accompanied by significant dryness.  Patient also experiencing polyarticular pain which is most bothersome to her right shoulder.  Continues to have midthoracic right-sided back pain related to recent fall and rib fracture.  Also continues to experience some low back pain which radiates to her right hip and into her right lower extremity.  Pain can be aching, throbbing and mouth pain is burning. Chronic numbness and tingling in her right lower extremity unchanged from previous exam.  She denies any increasing weakness or changes in bowel/bladder function.  Continues to experience mouth/throat pain accompanied by tongue burning and dryness.  Continues to have difficulty swallowing which is complicated by xerostomia. She has followed up with oncology with recent notes indicating no recurrence of disease.  She is using a mouth moisturizer which she states is helpful.  Continues to maintain a soft diet which currently is tolerable.  Continues to have difficulty swallowing pills.  She has done a nice job with weight gain.  Has not  "needed to utilize her Marinol as frequently.  Continues to manage her pain with MS IR oral solution 10 mg up to 4 times per day as needed.  She states that she will use 5 to 7.5 mg most often unless her pain is more severe.  Continued relief with the addition of duloxetine.  She is doing home therapy exercises and stretches to increase her strength and endurance.        Location of Pain: Patient returns to the office for interval re-evaluation of \"right shoulder pain that radiates down right arm to elbow and up neck, low right back pain/right hip radiates to the knee, mouth pain from cancer, pelvic pain due to fx around February\" stated pain complaints.        Pain Score: 4/10     Treatment: Morphine Sulfate Oral Solution IR 0.5 ml (10mg) up to 4 times a day   Medication Count: 30ml in unopened bottle and 28 ml left in other bottle  Fill Date: 7/16/24  Last Dose: 7/24/24 1 dose and take up to 4 doses a day but usually keeps it at 3  Efficacy: 50% for 4hrs  Side Effects: Denies     Narcan: Pt brought unopened Narcan to today's visit. EXP. 6/2024-gave harm reduction pamphlet     Other treatment plans-medications/neuromodulators: Duloxetine 60mg-needs refill     Injections and/or Procedures: Denies     Pregnant: n/a     Pt sts, \"I'm satisfied with my current plan of care\"  OARRS:  Kamini Sawant, APRN-CNP, APRN-CNS on 7/24/2024 12:44 PM  I have personally reviewed the OARRS report for Nayely Barrera. I have considered the risks of abuse, dependence, addiction and diversion    Is the patient prescribed a combination of a benzodiazepine and opioid?  No    Last Urine Drug Screen / ordered today: Yes  Recent Results (from the past 8760 hour(s))   Confirmation Opiate/Opioid/Benzo Prescription Compliance    Collection Time: 01/24/24  2:44 PM   Result Value Ref Range    Clonazepam <25 <25 ng/mL    7-Aminoclonazepam <25 <25 ng/mL    Alprazolam <25 <25 ng/mL    Alpha-Hydroxyalprazolam <25 <25 ng/mL    Midazolam <25 <25 ng/mL "    Alpha-Hydroxymidazolam <25 <25 ng/mL    Chlordiazepoxide <25 <25 ng/mL    Diazepam <25 <25 ng/mL    Nordiazepam <25 <25 ng/mL    Temazepam <25 <25 ng/mL    Oxazepam <25 <25 ng/mL    Lorazepam <25 <25 ng/mL    Methadone <25 <25 ng/mL    EDDP <25 <25 ng/mL    6-Acetylmorphine <25 <25 ng/mL    Codeine <50 <50 ng/mL    Hydrocodone <25 <25 ng/mL    Hydromorphone 73 (H) <25 ng/mL    Morphine  >2,500 (H) <50 ng/mL    Norhydrocodone <25 <25 ng/mL    Noroxycodone <25 <25 ng/mL    Oxycodone <25 <25 ng/mL    Oxymorphone <25 <25 ng/mL    Fentanyl <2.5 <2.5 ng/mL    Norfentanyl <2.5 <2.5 ng/mL    Tramadol <50 <50 ng/mL    O-Desmethyltramadol <50 <50 ng/mL    Zolpidem <25 <25 ng/mL    Zolpidem Metabolite (ZCA) <25 <25 ng/mL   Screen Opiate/Opioid/Benzo Prescription Compliance    Collection Time: 01/24/24  2:44 PM   Result Value Ref Range    Creatinine, Urine Random 23.7 20.0 - 320.0 mg/dL    Amphetamine Screen, Urine Presumptive Negative Presumptive Negative    Barbiturate Screen, Urine Presumptive Negative Presumptive Negative    Cannabinoid Screen, Urine Presumptive Negative Presumptive Negative    Cocaine Metabolite Screen, Urine Presumptive Negative Presumptive Negative    PCP Screen, Urine Presumptive Negative Presumptive Negative   Tapentadol Confirmation, Urine    Collection Time: 01/24/24  2:44 PM   Result Value Ref Range    Tapentadol <25 <25 ng/mL    N-Desmethyltapentadol <25 <25 ng/mL   Buprenorphine Confirm,Urine    Collection Time: 01/24/24  2:44 PM   Result Value Ref Range    BUPRENORPHINE GLUC, URINE <5 ng/mL    BUPRENORPHINE ,URINE <2 ng/mL    NALOXONE, URINE <100 ng/mL    NORBUPRENORPHINE GLUC,URINE <5 ng/mL    NORBUPRENORPHINE, URINE <2 ng/mL     Results are as expected.     Controlled Substance Agreement:  Date of the Last Agreement: 7/24/24  Reviewed Controlled Substance Agreement including but not limited to the benefits, risks, and alternatives to treatment with a Controlled Substance  medication(s).    Monitoring and compliance:    ORT:    PDUQ:    Office Agreement:      Review of Systems    ROS:   General: No fevers, chills, weight loss  Skin: Negative for lesions  Eyes: No acute vision changes  Ears: No vertigo  Nose, mouth, throat: Positive for difficulty swallowing,oropharyngeal pain and xerostomia    Respiratory: No cough, shortness of breath, cyanosis  Cardiovascular: Negative for chest pain syncope or palpitation  Gastrointestinal: No constipation, nausea, vomiting  Neurological: Negative for headache, positive for: Paresthesia right lower extremity  Psychological: Negative for severe or debilitating anxiety, depression. Negative memory loss  Musculoskeletal: Positive for  Endocrine: Negative for weight gain, appetite changes, excessive sweating  Allergy/immune: Negative    All 13 systems were reviewed and are within normal levels except as noted or in the history of present illness.  Positive or pertinent negative responses are noted or were in the history of present illness. As noted, the patient denies significant or impairing weakness in the bilateral upper and lower extremities, medication induced constipation, and bowel or bladder incontinence.     Current Outpatient Medications:     albuterol 90 mcg/actuation inhaler, Inhale 2 puffs every 4 hours if needed., Disp: , Rfl:     Anoro Ellipta 62.5-25 mcg/actuation blister with device, Inhale 1 puff once daily., Disp: , Rfl:     droNABinol (Syndros) 5 mg/mL solution, 0.5 mL EVERY 12 HOURS (route: oral), Disp: 30 mL, Rfl: 0    ergocalciferol (Vitamin D-2) 1.25 MG (64934 UT) capsule, TAKE ONE CAPSULE BY MOUTH ONCE A WEEK, Disp: 5 capsule, Rfl: 0    ibuprofen 200 mg tablet, Take by mouth 2 times a day., Disp: , Rfl:     levothyroxine (Synthroid, Levoxyl) 75 mcg tablet, Take 1 tablet (75 mcg) by mouth once daily in the morning. Take before meals. Take all by itself with water only and then wait at least 1 hour before having food or other  "medication., Disp: 90 tablet, Rfl: 3    morphine 20 mg/mL concentrated oral solution, Take 0.5 mL (10 mg) by mouth 4 times a day as needed for severe pain (7 - 10)., Disp: 60 mL, Rfl: 0    naloxone (Narcan) 4 mg/0.1 mL nasal spray, Administer into affected nostril(s)., Disp: , Rfl:     DULoxetine (Cymbalta) 60 mg DR capsule, TAKE ONE CAPSULE BY MOUTH DAILY. PATIENT MAY OPEN CAPSULE AND SPRINKLE IN FOOD., Disp: 30 capsule, Rfl: 2     No past medical history on file.     Past Surgical History:   Procedure Laterality Date    OTHER SURGICAL HISTORY  08/24/2022    Tubal ligation    OTHER SURGICAL HISTORY  08/24/2022    Cholecystectomy    OTHER SURGICAL HISTORY  08/24/2022    Hip surgery    OTHER SURGICAL HISTORY  08/24/2022    Tongue surgery    OTHER SURGICAL HISTORY  08/24/2022    Shoulder surgery        No family history on file.     No Known Allergies     Objective     Visit Vitals  /80   Pulse 81   Resp 16   Ht 1.651 m (5' 5\")   Wt 50.4 kg (111 lb 3.2 oz)   SpO2 96%   BMI 18.50 kg/m²   Smoking Status Never   BSA 1.52 m²        Physical Exam    PE:  General: Frail/thin elderly female, no acute distress. The patient demonstrates no pain behavior, symptom magnification or overt drug-seeking behavior.  Eye: Pupils appropriate for room lighting  Neck/thyroid: No obvious goiter or enlargement of neck noted  Respiratory exam: Normal respiratory effort, unlabored respiration. No accessory muscle use noted  Cardiac exam: Bilateral radial pulses intact  Abdominal: Nondistended  Spine, lumbar: The patient is able to rise from a seated to standing position without hesitancy, push off, or delay. Gait is slow, deliberate and slightly forward leaning.  Tenderness to paraspinous musculature is noted lower thoracic and lumbar spine right greater than left.  Tenderness over posterior right ribs.  Flexion and extension limited secondary to stiffness.  Ortho: Right shoulder: Pain with active/passive range of motion right shoulder. "  Chronic elevation of right shoulder.  Limited external rotation right shoulder.  Points and muscle tightness right trapezius.  Neurologic exam: Muscle strength is antigravity in all 4 extremities.  Equal muscle strength bilateral upper extremities 5/5.  Equal muscle strength bilateral lower extremities 5/5.  Psychiatric exam: Judgment and insight normal, affect normal, speech is fluent, affect appropriate, demonstrating no signs of hypersomnolence, sedation, or confusion        Assessment/Plan   Problem List Items Addressed This Visit             ICD-10-CM    Cancer-related pain G89.3    Relevant Medications    DULoxetine (Cymbalta) 60 mg DR capsule    Facial pain R51.9    Relevant Medications    DULoxetine (Cymbalta) 60 mg DR capsule    Lumbago M54.50    Polyarthralgia M25.50     68 year-old female with history of oropharyngeal cancer resulting in chronic cancer related pain as well as dysphagia/xerostomia. Patient also has chronic low back pain and polyarticular pain.  Low back pain remains stable and unchanged from previous exam.  Continues to slowly improve from her recent fall which resulted in right rib pain.  Continues to have difficulty with swallowing and significant dry mouth.  Tolerating a soft diet. Difficulty with swallowing pills.  She is using a mouth moisturizer which she states helps.  She was recently evaluated by oncology with notes indicating no cancer recurrence.  She is doing a good job with weight gain.  Has not needed to use her Marinol as often to stimulate appetite.  She is noting persistent stiffness in her right shoulder limiting range of motion as well as posterior rib pain and thoracic muscle tightness on the right.  Offered a formal course of physical therapy to improve range of motion and address myofascial pain.  Patient would like to restart her home physical therapy exercises and stretches before pursuing a formal course of physical therapy.  If she does not notice improvement in  range of motion and myofascial pain we will send the patient for formal course of physical therapy.  She continues to manage her pain with MS IR oral solution 10 mg up to 4 times per day.  She continues to attempt to decrease her use of MS IR often times in the middle of the day using 5 to 7.5 mg instead of 10 mg.  Continued benefit with duloxetine 60 mg daily.  Plan reviewed with patient at today's visit.      -Continue MS IR oral solution 10 mg every 6 hours as needed for pain.  We will continue this medication at this time as the patient feels it provides significant relief without adverse effects.  Patient will try to take the least amount of this medication as possible decreasing her dose to 5 to 7.5 mg when able.  She may call for refill if needed prior to her next visit.  -Continue duloxetine 60 mg daily.  Patient is able to open capsules and sprinkle in food. Currently tolerating this medication without adverse effects.  -Continue Marinol for appetite stimulation.  Patient will continue to work on weight gain.  -Patient will restart home physical therapy exercises and stretches targeting right shoulder as well as myofascial pain and back pain.  Continue home  exercises consistently 5 to 6 days/week for optimal results.  If patient does not receive relief with home therapy exercises and stretches we may send her for formal course of physical therapy.    -Patient will follow-up in 3 months or sooner if needed.    MEDICAL DECISION MAKING:    My impressions and treatment recommendations were discussed in detail with the patient, who verbalized understanding and had no further questions prior to discharge. Given the patient's report of reduced pain and improved functional ability without adverse effects,  it is reasonable to continue MS IR oral solution 10 mg every 6 hours as needed.  The terms of the opioid agreement as well as the potential risks and adverse effects of the patient's medication regimen were  discussed in detail. This includes if applicable due to dosage of medication permission to discuss and coordinate care with other treatment providers relevant to the patients condition. The patient verbalized understanding.    Treatment goals were discussed in detail with the patient.  These goals include reduction of pain levels, improved levels of functioning, avoidance of medication side effect and lowest medication dose possible to achieve  these goals.  The patient was in full agreement with these goals.  Also discussed is the understanding that pain may not be eliminated by medication but that the goal of a better sustaining life through use of medication is appropriate.  Lifestyle modifications including weight management, stretching, diet, exercise and smoking are addressed at each office visit.  The patient provided a urine drug screen for routine monitoring and compliance.  This test may be given as frequently as every month based on the patient's individual opiate risk stratification and prescriber concerns for any aberrancies.  This test is indicated given the use of controlled substances for the patient's medical condition.  Unless otherwise noted the prior (s) urine drug testing results were consistent with prescribed medications.  There is no evidence of illicit drug use or additional medications ingested.     Risks and side effects of chronic opioid therapy including but not limited to tolerance, dependence, constipation, hyperalgesia, cognitive side effects, addiction and possible death due to overuse and or misuse were discussed. I also discussed that such medications when co-administered with other sedative agents including but not limited to alcohol, benzodiazepines, sedative hypnotics and illegal drugs could pose life threatening consequences including death.  I also explained the impact that the administration of such medication has on a patient with obstructive sleep apnea and continued  recommendations for use of apnea devices if ordered are prescribed by other physicians.  In order to effectively and safely treat the pain, I also emphasized the importance of compliance with the treatment plan as well as compliance with the terms of the opioid agreement which was reviewed in detail. I explained the importance of being responsible with the medications and to take these only as prescribed, never in excess and never for reasons other than pain reduction. The patient was counseled on keeping the medications safe and locked away from children and other adults as well as disposal methods and options. The patient understood the risks and instructions.      I also discussed with the patient in detail that based on the clinical response to the opioid medications and improvements of activities of daily living, sleep, and work performance in light of compliance with the treatment plan we can continue this form of therapy for the above chronic  pain.  The goal and rationale used for current treatment with chronic opioid medication is to control the pain and alleviate disability induced by the chronic pain condition noted above after failures of other non-opioid and nonpharmacological modalities to treat the chronic pain and the symptoms associated with have failed.  The patient understood the goals in terms of the above treatment plan and had no further questions prior to leaving the office today.    Given the patient's total MED, general use of daily opiates, or other coadministered medications in various classes the patient was offered a prescription for Narcan.  I instructed the patient that Narcan is for emergency use only and is worse suspected or known opiate overdose.  Call 911 prior to administration of this medication to activate the emergency response team.  It is imperative to fill this medication in order to demonstrate understanding of the gravity of possible side effects including respiratory  depression and risk of overdose of this opiate load or medication combination.  As such patients will be required to bring Narcan prescriptions to follow-up appointments as part of the compliance with continued opiate care.    Disclaimer: This note was transcribed using an audio transcription device.  As such, minor errors may be present with regard to spelling, punctuation, and inadvertent word insertion.  Please disregard such errors.      -  -         Relevant Medications    DULoxetine (Cymbalta) 60 mg DR capsule    Right shoulder pain M25.511     Other Visit Diagnoses         Codes    Long term prescription opiate use    -  Primary Z79.891    Relevant Orders    Buprenorphine Confirm,Urine    Tapentadol Confirmation, Urine    Alcohol, Urine    Opiate/Opioid/Benzo Prescription Compliance    OOB Internal Tracking

## 2024-07-24 NOTE — ASSESSMENT & PLAN NOTE
68 year-old female with history of oropharyngeal cancer resulting in chronic cancer related pain as well as dysphagia/xerostomia. Patient also has chronic low back pain and polyarticular pain.  Low back pain remains stable and unchanged from previous exam.  Continues to slowly improve from her recent fall which resulted in right rib pain.  Continues to have difficulty with swallowing and significant dry mouth.  Tolerating a soft diet. Difficulty with swallowing pills.  She is using a mouth moisturizer which she states helps.  She was recently evaluated by oncology with notes indicating no cancer recurrence.  She is doing a good job with weight gain.  Has not needed to use her Marinol as often to stimulate appetite.  She is noting persistent stiffness in her right shoulder limiting range of motion as well as posterior rib pain and thoracic muscle tightness on the right.  Offered a formal course of physical therapy to improve range of motion and address myofascial pain.  Patient would like to restart her home physical therapy exercises and stretches before pursuing a formal course of physical therapy.  If she does not notice improvement in range of motion and myofascial pain we will send the patient for formal course of physical therapy.  She continues to manage her pain with MS IR oral solution 10 mg up to 4 times per day.  She continues to attempt to decrease her use of MS IR often times in the middle of the day using 5 to 7.5 mg instead of 10 mg.  Continued benefit with duloxetine 60 mg daily.  Plan reviewed with patient at today's visit.      -Continue MS IR oral solution 10 mg every 6 hours as needed for pain.  We will continue this medication at this time as the patient feels it provides significant relief without adverse effects.  Patient will try to take the least amount of this medication as possible decreasing her dose to 5 to 7.5 mg when able.  She may call for refill if needed prior to her next  visit.  -Continue duloxetine 60 mg daily.  Patient is able to open capsules and sprinkle in food. Currently tolerating this medication without adverse effects.  -Continue Marinol for appetite stimulation.  Patient will continue to work on weight gain.  -Patient will restart home physical therapy exercises and stretches targeting right shoulder as well as myofascial pain and back pain.  Continue home  exercises consistently 5 to 6 days/week for optimal results.  If patient does not receive relief with home therapy exercises and stretches we may send her for formal course of physical therapy.    -Patient will follow-up in 3 months or sooner if needed.    MEDICAL DECISION MAKING:    My impressions and treatment recommendations were discussed in detail with the patient, who verbalized understanding and had no further questions prior to discharge. Given the patient's report of reduced pain and improved functional ability without adverse effects,  it is reasonable to continue MS IR oral solution 10 mg every 6 hours as needed.  The terms of the opioid agreement as well as the potential risks and adverse effects of the patient's medication regimen were discussed in detail. This includes if applicable due to dosage of medication permission to discuss and coordinate care with other treatment providers relevant to the patients condition. The patient verbalized understanding.    Treatment goals were discussed in detail with the patient.  These goals include reduction of pain levels, improved levels of functioning, avoidance of medication side effect and lowest medication dose possible to achieve  these goals.  The patient was in full agreement with these goals.  Also discussed is the understanding that pain may not be eliminated by medication but that the goal of a better sustaining life through use of medication is appropriate.  Lifestyle modifications including weight management, stretching, diet, exercise and smoking are  addressed at each office visit.  The patient provided a urine drug screen for routine monitoring and compliance.  This test may be given as frequently as every month based on the patient's individual opiate risk stratification and prescriber concerns for any aberrancies.  This test is indicated given the use of controlled substances for the patient's medical condition.  Unless otherwise noted the prior (s) urine drug testing results were consistent with prescribed medications.  There is no evidence of illicit drug use or additional medications ingested.     Risks and side effects of chronic opioid therapy including but not limited to tolerance, dependence, constipation, hyperalgesia, cognitive side effects, addiction and possible death due to overuse and or misuse were discussed. I also discussed that such medications when co-administered with other sedative agents including but not limited to alcohol, benzodiazepines, sedative hypnotics and illegal drugs could pose life threatening consequences including death.  I also explained the impact that the administration of such medication has on a patient with obstructive sleep apnea and continued recommendations for use of apnea devices if ordered are prescribed by other physicians.  In order to effectively and safely treat the pain, I also emphasized the importance of compliance with the treatment plan as well as compliance with the terms of the opioid agreement which was reviewed in detail. I explained the importance of being responsible with the medications and to take these only as prescribed, never in excess and never for reasons other than pain reduction. The patient was counseled on keeping the medications safe and locked away from children and other adults as well as disposal methods and options. The patient understood the risks and instructions.      I also discussed with the patient in detail that based on the clinical response to the opioid medications and  improvements of activities of daily living, sleep, and work performance in light of compliance with the treatment plan we can continue this form of therapy for the above chronic  pain.  The goal and rationale used for current treatment with chronic opioid medication is to control the pain and alleviate disability induced by the chronic pain condition noted above after failures of other non-opioid and nonpharmacological modalities to treat the chronic pain and the symptoms associated with have failed.  The patient understood the goals in terms of the above treatment plan and had no further questions prior to leaving the office today.    Given the patient's total MED, general use of daily opiates, or other coadministered medications in various classes the patient was offered a prescription for Narcan.  I instructed the patient that Narcan is for emergency use only and is worse suspected or known opiate overdose.  Call 911 prior to administration of this medication to activate the emergency response team.  It is imperative to fill this medication in order to demonstrate understanding of the gravity of possible side effects including respiratory depression and risk of overdose of this opiate load or medication combination.  As such patients will be required to bring Narcan prescriptions to follow-up appointments as part of the compliance with continued opiate care.    Disclaimer: This note was transcribed using an audio transcription device.  As such, minor errors may be present with regard to spelling, punctuation, and inadvertent word insertion.  Please disregard such errors.      -  -

## 2024-07-26 ENCOUNTER — TELEPHONE (OUTPATIENT)
Dept: PAIN MEDICINE | Facility: HOSPITAL | Age: 69
End: 2024-07-26
Payer: MEDICARE

## 2024-07-26 NOTE — TELEPHONE ENCOUNTER
I spoke with the patient she verbalized understanding to come in next week some time to repeat UDS due to urine being diluted too much per lab.

## 2024-07-27 LAB
1OH-MIDAZOLAM UR CFM-MCNC: <25 NG/ML
6MAM UR CFM-MCNC: <25 NG/ML
7AMINOCLONAZEPAM UR CFM-MCNC: <25 NG/ML
A-OH ALPRAZ UR CFM-MCNC: <25 NG/ML
ALPRAZ UR CFM-MCNC: <25 NG/ML
CHLORDIAZEP UR CFM-MCNC: <25 NG/ML
CLONAZEPAM UR CFM-MCNC: <25 NG/ML
CODEINE UR CFM-MCNC: <50 NG/ML
DIAZEPAM UR CFM-MCNC: <25 NG/ML
EDDP UR CFM-MCNC: <25 NG/ML
FENTANYL UR CFM-MCNC: <2.5 NG/ML
HYDROCODONE CTO UR CFM-MCNC: <25 NG/ML
HYDROMORPHONE UR CFM-MCNC: 65 NG/ML
LORAZEPAM UR CFM-MCNC: <25 NG/ML
METHADONE UR CFM-MCNC: <25 NG/ML
MIDAZOLAM UR CFM-MCNC: <25 NG/ML
MORPHINE UR CFM-MCNC: >2500 NG/ML
NORDIAZEPAM UR CFM-MCNC: <25 NG/ML
NORFENTANYL UR CFM-MCNC: <2.5 NG/ML
NORHYDROCODONE UR CFM-MCNC: <25 NG/ML
NOROXYCODONE UR CFM-MCNC: <25 NG/ML
NORTAPENTADOL UR CFM-MCNC: <25 NG/ML
NORTRAMADOL UR-MCNC: <50 NG/ML
OXAZEPAM UR CFM-MCNC: <25 NG/ML
OXYCODONE UR CFM-MCNC: <25 NG/ML
OXYMORPHONE UR CFM-MCNC: <25 NG/ML
TAPENTADOL UR CFM-MCNC: <25 NG/ML
TEMAZEPAM UR CFM-MCNC: <25 NG/ML
TRAMADOL UR CFM-MCNC: <50 NG/ML
ZOLPIDEM UR CFM-MCNC: <25 NG/ML
ZOLPIDEM UR-MCNC: <25 NG/ML

## 2024-07-31 LAB
AMPHETAMINES UR QL SCN: NORMAL
BARBITURATES UR QL SCN: NORMAL
BZE UR QL SCN: NORMAL
CANNABINOIDS UR QL SCN: NORMAL
CREAT UR-MCNC: 56.9 MG/DL (ref 20–320)
ETHANOL ?TM UR-MCNC: <10 MG/DL
PCP UR QL SCN: NORMAL

## 2024-07-31 PROCEDURE — 80372 DRUG SCREENING TAPENTADOL: CPT | Mod: PORLAB | Performed by: CLINICAL NURSE SPECIALIST

## 2024-07-31 PROCEDURE — 82570 ASSAY OF URINE CREATININE: CPT | Mod: 59,PORLAB | Performed by: CLINICAL NURSE SPECIALIST

## 2024-07-31 PROCEDURE — 80307 DRUG TEST PRSMV CHEM ANLYZR: CPT | Mod: PORLAB | Performed by: CLINICAL NURSE SPECIALIST

## 2024-07-31 PROCEDURE — 80365 DRUG SCREENING OXYCODONE: CPT | Mod: PORLAB | Performed by: CLINICAL NURSE SPECIALIST

## 2024-08-02 LAB
1OH-MIDAZOLAM UR CFM-MCNC: <25 NG/ML
6MAM UR CFM-MCNC: <25 NG/ML
7AMINOCLONAZEPAM UR CFM-MCNC: <25 NG/ML
A-OH ALPRAZ UR CFM-MCNC: <25 NG/ML
ALPRAZ UR CFM-MCNC: <25 NG/ML
CHLORDIAZEP UR CFM-MCNC: <25 NG/ML
CLONAZEPAM UR CFM-MCNC: <25 NG/ML
CODEINE UR CFM-MCNC: <50 NG/ML
DIAZEPAM UR CFM-MCNC: <25 NG/ML
EDDP UR CFM-MCNC: <25 NG/ML
FENTANYL UR CFM-MCNC: <2.5 NG/ML
HYDROCODONE CTO UR CFM-MCNC: <25 NG/ML
HYDROMORPHONE UR CFM-MCNC: 137 NG/ML
LORAZEPAM UR CFM-MCNC: <25 NG/ML
METHADONE UR CFM-MCNC: <25 NG/ML
MIDAZOLAM UR CFM-MCNC: <25 NG/ML
MORPHINE UR CFM-MCNC: >2500 NG/ML
NORDIAZEPAM UR CFM-MCNC: <25 NG/ML
NORFENTANYL UR CFM-MCNC: <2.5 NG/ML
NORHYDROCODONE UR CFM-MCNC: <25 NG/ML
NOROXYCODONE UR CFM-MCNC: <25 NG/ML
NORTAPENTADOL UR CFM-MCNC: <25 NG/ML
NORTRAMADOL UR-MCNC: <50 NG/ML
OXAZEPAM UR CFM-MCNC: <25 NG/ML
OXYCODONE UR CFM-MCNC: <25 NG/ML
OXYMORPHONE UR CFM-MCNC: <25 NG/ML
TAPENTADOL UR CFM-MCNC: <25 NG/ML
TEMAZEPAM UR CFM-MCNC: <25 NG/ML
TRAMADOL UR CFM-MCNC: <50 NG/ML
ZOLPIDEM UR CFM-MCNC: <25 NG/ML
ZOLPIDEM UR-MCNC: <25 NG/ML

## 2024-08-06 ENCOUNTER — APPOINTMENT (OUTPATIENT)
Dept: RADIOLOGY | Facility: CLINIC | Age: 69
End: 2024-08-06
Payer: MEDICARE

## 2024-08-14 ENCOUNTER — HOSPITAL ENCOUNTER (OUTPATIENT)
Dept: RADIOLOGY | Facility: CLINIC | Age: 69
Discharge: HOME | End: 2024-08-14
Payer: MEDICARE

## 2024-08-14 DIAGNOSIS — Z87.891 PERSONAL HISTORY OF NICOTINE DEPENDENCE: ICD-10-CM

## 2024-08-14 DIAGNOSIS — G89.3 CANCER-RELATED PAIN: ICD-10-CM

## 2024-08-14 DIAGNOSIS — R51.9 FACIAL PAIN: ICD-10-CM

## 2024-08-14 PROCEDURE — 71271 CT THORAX LUNG CANCER SCR C-: CPT

## 2024-08-14 NOTE — TELEPHONE ENCOUNTER
Pt is requesting refill of   liquid morphine 10 mg QID  [place]                                                       LV:   7/24/24                 NV:  10/16/24                OARRS reviewed with LFD:     7/16/24 #60/30 days                       Pended RX to [facility] for transmission to pharmacy.

## 2024-08-16 ENCOUNTER — OFFICE VISIT (OUTPATIENT)
Dept: PULMONOLOGY | Facility: HOSPITAL | Age: 69
End: 2024-08-16
Payer: MEDICARE

## 2024-08-16 VITALS
TEMPERATURE: 96.9 F | WEIGHT: 112.6 LBS | BODY MASS INDEX: 18.76 KG/M2 | HEART RATE: 81 BPM | HEIGHT: 65 IN | RESPIRATION RATE: 16 BRPM | OXYGEN SATURATION: 97 % | SYSTOLIC BLOOD PRESSURE: 130 MMHG | DIASTOLIC BLOOD PRESSURE: 63 MMHG

## 2024-08-16 DIAGNOSIS — R91.8 MULTIPLE LUNG NODULES: ICD-10-CM

## 2024-08-16 DIAGNOSIS — J44.9 CHRONIC OBSTRUCTIVE PULMONARY DISEASE, UNSPECIFIED COPD TYPE (MULTI): Primary | ICD-10-CM

## 2024-08-16 DIAGNOSIS — Z87.891 FORMER CIGARETTE SMOKER: ICD-10-CM

## 2024-08-16 PROCEDURE — 3008F BODY MASS INDEX DOCD: CPT | Performed by: INTERNAL MEDICINE

## 2024-08-16 PROCEDURE — 1159F MED LIST DOCD IN RCRD: CPT | Performed by: INTERNAL MEDICINE

## 2024-08-16 PROCEDURE — 1036F TOBACCO NON-USER: CPT | Performed by: INTERNAL MEDICINE

## 2024-08-16 PROCEDURE — 99213 OFFICE O/P EST LOW 20 MIN: CPT | Performed by: INTERNAL MEDICINE

## 2024-08-16 RX ORDER — MORPHINE SULFATE 20 MG/ML
10 SOLUTION ORAL 4 TIMES DAILY PRN
Qty: 60 ML | Refills: 0 | Status: SHIPPED | OUTPATIENT
Start: 2024-08-16 | End: 2024-09-15

## 2024-08-16 ASSESSMENT — ENCOUNTER SYMPTOMS: SHORTNESS OF BREATH: 1

## 2024-08-16 NOTE — TELEPHONE ENCOUNTER
To refill oral morphine solution 100 mg per 5 mL with dose of 10 mg every 6 hours as needed for pain.  60 mL for 30 days.

## 2024-08-16 NOTE — PROGRESS NOTES
Subjective   Patient ID: Nayely Barrera is a 68 y.o. female who presents for COPD (Patient is here for follow up visit. Patient states her breathing is good. Patient denies coughs. Patient has rescue inhaler using when needed. Patient had CT scan done . Patient has no new concerns. ).  HPI  Ms. Barrera is a 66 yo female who was diagnosed with tongue cancer in Sep 2020 and underwent resection of cancer and followed by radiation therapy with Dr. Espinal. She is now referred for evaluation of 'chronic waxing and waning lung nodules.' Patient reports that she was initially told to have lung nodules in 2009 at [facility] and was also diagnosed with COPD. Patient has hx of smoking and quit in 2017 after she smoked for about 40 years of 1 ppd. She denies unintentional weight loss or change in appetite. She states that she has FLOWER with strenuous activity. She denies coughing and states it is better since she stopped smoking.      Today she is here for follow up. She states she is not getting short of breath on usual activity anymore and does not take Anoro daily. She has rarely needed albuterol and instead used anoro mostly as prn. She gets SOB with prolonged or strenuous activity at times only. She denies cough. chest pain, leg swelling, or hemoptysis. She denies being exposed to any second hand smoke. She denies any unintentional weight loss. She is following with ENT to follow up for tongue cancer with Dr. Del Castillo.    Review of Systems   Respiratory:  Positive for shortness of breath.    All other systems reviewed and are negative.      Objective   Physical Exam  Vitals and nursing note reviewed.   Constitutional:       Appearance: Normal appearance.   HENT:      Head: Normocephalic.      Nose: Nose normal.      Mouth/Throat:      Pharynx: Oropharynx is clear.   Eyes:      Extraocular Movements: Extraocular movements intact.      Conjunctiva/sclera: Conjunctivae normal.      Pupils: Pupils are equal,  round, and reactive to light.   Cardiovascular:      Rate and Rhythm: Normal rate and regular rhythm.      Pulses: Normal pulses.      Heart sounds: Normal heart sounds.   Pulmonary:      Effort: Pulmonary effort is normal.      Breath sounds: Normal breath sounds.   Abdominal:      General: Bowel sounds are normal.      Palpations: Abdomen is soft.   Musculoskeletal:         General: Normal range of motion.      Cervical back: Normal range of motion.   Skin:     General: Skin is warm.   Neurological:      General: No focal deficit present.      Mental Status: She is alert and oriented to person, place, and time. Mental status is at baseline.   Psychiatric:         Mood and Affect: Mood normal.         Behavior: Behavior normal.       Assessment/Plan   1. COPD  2. Multiple lung nodules up to 7 mm  3. Tongue cancer, s/p resection and XRT  4. Former smoker         Plan:     1. Patient has significant COPD clinically. Significant emphysematous changes noted on CT chest as well. She quit smoking in 2017. PFTs with FEV1/FVC 63, FEV1 82, discussed results with patient and her daughter. She did have some BDR, encouraged use of albuterol. Patient has not been taking Symbicort, which we stopped. We recommended Anoro for FLOWER which she states has helped but does not take every day. Ok to stop Anoro and keep on LUAN prn. Discussed to use albuterol inhaler with low threshold and advised her to contact us if any respiratory issues worsen.      2. Her multiple lung nodules up to 7 mm were again noted on the CT chest in September 2020 which were done with the purpose of staging for tongue cancer. Radiology report indicates that these nodules have been stable since 2009. CT scan done July 27th 2021 with stable nodules. Repeat CT in July 2022 with stable nodules and benign appearance. Discussed results today for LDCT chest August 2023 and 2024: stable nodules.     3. She has chronic calcific pancreatitis. She still gets occasional  abdominal pain. She has not followed with GI. Recommend establish with GI for continued monitoring.      Recommend patient to get LDCT chest Augst 2025 and contniue annually through 2032. OJ VAUGHN and report if FLOWER worsens. We planned to keep LUAN prn but she does not usually remember to take it. She is currently moved in with her daughter in [facility]. She will however contact us if any worsening of her respiratory symptoms.

## 2024-08-16 NOTE — PATIENT INSTRUCTIONS
1. Please get CT scan of your lungs in August next year.   2. Continue to use albuterol as needed for shortness of breath.  3. Call with any questions or concerns.   4. Follow up with Dr. Page in late August 2025 after CT scan is completed.

## 2024-09-12 DIAGNOSIS — G89.3 CANCER-RELATED PAIN: ICD-10-CM

## 2024-09-12 DIAGNOSIS — R51.9 FACIAL PAIN: ICD-10-CM

## 2024-09-12 RX ORDER — MORPHINE SULFATE 20 MG/ML
10 SOLUTION ORAL 4 TIMES DAILY PRN
Qty: 60 ML | Refills: 0 | Status: SHIPPED | OUTPATIENT
Start: 2024-09-15 | End: 2024-10-15

## 2024-09-12 NOTE — TELEPHONE ENCOUNTER
Requested refill for Morphine Oral Solution 0.5ml (10mg) QID.  LV: 7/24/24  NV: 10/16/24  OARRS verified LFD: 8/16/24  Last SD: 8/16/24  Next SD: 9/15/24  [facility]

## 2024-10-16 ENCOUNTER — OFFICE VISIT (OUTPATIENT)
Dept: PAIN MEDICINE | Facility: HOSPITAL | Age: 69
End: 2024-10-16
Payer: MEDICARE

## 2024-10-16 VITALS
DIASTOLIC BLOOD PRESSURE: 65 MMHG | BODY MASS INDEX: 18.63 KG/M2 | RESPIRATION RATE: 16 BRPM | OXYGEN SATURATION: 97 % | WEIGHT: 111.8 LBS | SYSTOLIC BLOOD PRESSURE: 133 MMHG | HEIGHT: 65 IN | HEART RATE: 78 BPM

## 2024-10-16 DIAGNOSIS — R52 PAIN AFTER RADIATION THERAPY: ICD-10-CM

## 2024-10-16 DIAGNOSIS — R51.9 FACIAL PAIN: ICD-10-CM

## 2024-10-16 DIAGNOSIS — Z79.891 LONG TERM PRESCRIPTION OPIATE USE: Primary | ICD-10-CM

## 2024-10-16 DIAGNOSIS — M25.50 POLYARTHRALGIA: ICD-10-CM
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
## 2024-10-16 DIAGNOSIS — G89.3 CANCER-RELATED PAIN: ICD-10-CM

## 2024-10-16 PROCEDURE — 1160F RVW MEDS BY RX/DR IN RCRD: CPT | Performed by: CLINICAL NURSE SPECIALIST

## 2024-10-16 PROCEDURE — 99214 OFFICE O/P EST MOD 30 MIN: CPT | Performed by: CLINICAL NURSE SPECIALIST

## 2024-10-16 PROCEDURE — 1159F MED LIST DOCD IN RCRD: CPT | Performed by: CLINICAL NURSE SPECIALIST

## 2024-10-16 PROCEDURE — 1036F TOBACCO NON-USER: CPT | Performed by: CLINICAL NURSE SPECIALIST

## 2024-10-16 PROCEDURE — 3008F BODY MASS INDEX DOCD: CPT | Performed by: CLINICAL NURSE SPECIALIST

## 2024-10-16 RX ORDER — DULOXETIN HYDROCHLORIDE 60 MG/1
CAPSULE, DELAYED RELEASE ORAL
Qty: 30 CAPSULE | Refills: 5 | Status: SHIPPED | OUTPATIENT
Start: 2024-10-16

## 2024-10-16 RX ORDER — NALOXONE HYDROCHLORIDE 4 MG/.1ML
4 SPRAY NASAL AS NEEDED
Qty: 2 EACH | Refills: 0 | Status: SHIPPED | OUTPATIENT
Start: 2024-10-16 | End: 2024-10-17

## 2024-10-16 RX ORDER — MORPHINE SULFATE 20 MG/ML
10 SOLUTION ORAL 4 TIMES DAILY PRN
Qty: 60 ML | Refills: 0 | Status: SHIPPED | OUTPATIENT
Start: 2024-10-23 | End: 2024-11-22

## 2024-10-16 ASSESSMENT — ENCOUNTER SYMPTOMS
LOSS OF SENSATION IN FEET: 0
OCCASIONAL FEELINGS OF UNSTEADINESS: 0
DEPRESSION: 0

## 2024-10-16 ASSESSMENT — COLUMBIA-SUICIDE SEVERITY RATING SCALE - C-SSRS
1. IN THE PAST MONTH, HAVE YOU WISHED YOU WERE DEAD OR WISHED YOU COULD GO TO SLEEP AND NOT WAKE UP?: NO
6. HAVE YOU EVER DONE ANYTHING, STARTED TO DO ANYTHING, OR PREPARED TO DO ANYTHING TO END YOUR LIFE?: NO
2. HAVE YOU ACTUALLY HAD ANY THOUGHTS OF KILLING YOURSELF?: NO

## 2024-10-16 NOTE — ASSESSMENT & PLAN NOTE
69 year-old female with history of oropharyngeal cancer resulting in chronic cancer related pain as well as dysphagia. Patient also has chronic low back pain and polyarticular pain.  Right shoulder pain and low back pain remains stable and unchanged from previous exam.  Patient has noted continued improvement in right rib pain after recent fall fracturing her ribs.  Unfortunately patient had a second fall after falling down several steps resulting in a pelvic fracture and additional right rib fracture.  She states that she is also recovering from this fall with improvement in pain.  She denies any difficulty breathing.  Continues to experience oral pain/mouth pain most noted when she is eating.  Pain accompanied by dryness and burning.  Also experiencing widespread polyarticular pain which she states remains stable.  She is doing a good job maintaining her weight.  Treating oral dryness with mouth moisturizer and utilizing Ensure for weight gain.  Utilizing Marinol sparingly as it causes increasing burning in her mouth.  She continues to manage her pain with MS IR oral solution 10 mg up to 4 times per day, duloxetine 60 mg daily and Marinol 0.5 mL every 12 hours for appetite stimulation.  She tries to limit her MS IR to 2-3 times per day on most days and uses the fourth dose only when pain is more intense.  At this time I feel it is reasonable to continue with the same regimen.  Discussed with patient that it would be optimal for her to use the least amount of morphine possible to prevent hyperalgesia or tolerance in the future.  She states that she tries to limit her use of morphine and often times takes half the dose or 5 mg when her pain is more controlled.  Plan reviewed with patient at today's visit.      -Continue MS IR oral solution 10 mg every 6 hours as needed for pain.  We will continue this medication at this time as the patient feels it provides significant relief without adverse effects.  The patient  tries to limit her use of this medication taking 2-3 times per day on most days and limit her dose to 5 to 7.5 mg when able.  -Continue duloxetine 60 mg daily.  Patient is able to open capsules and sprinkle in food. Currently tolerating this medication without adverse effects.  -Continue Marinol for appetite stimulation.  Patient will continue to work on weight gain.  -Patient will continue home  exercises consistently 5 to 6 days/week targeting increasing strength to lower extremities.  -Patient will follow-up in 3 months or sooner if needed.    MEDICAL DECISION MAKING:    My impressions and treatment recommendations were discussed in detail with the patient, who verbalized understanding and had no further questions prior to discharge. Given the patient's report of reduced pain and improved functional ability without adverse effects,  it is reasonable to continue MS IR oral solution 10 mg every 6 hours as needed.  The terms of the opioid agreement as well as the potential risks and adverse effects of the patient's medication regimen were discussed in detail. This includes if applicable due to dosage of medication permission to discuss and coordinate care with other treatment providers relevant to the patients condition. The patient verbalized understanding.    Treatment goals were discussed in detail with the patient.  These goals include reduction of pain levels, improved levels of functioning, avoidance of medication side effect and lowest medication dose possible to achieve  these goals.  The patient was in full agreement with these goals.  Also discussed is the understanding that pain may not be eliminated by medication but that the goal of a better sustaining life through use of medication is appropriate.  Lifestyle modifications including weight management, stretching, diet, exercise and smoking are addressed at each office visit.  The patient provided a urine drug screen for routine monitoring and  compliance.  This test may be given as frequently as every month based on the patient's individual opiate risk stratification and prescriber concerns for any aberrancies.  This test is indicated given the use of controlled substances for the patient's medical condition.  Unless otherwise noted the prior (s) urine drug testing results were consistent with prescribed medications.  There is no evidence of illicit drug use or additional medications ingested.     Risks and side effects of chronic opioid therapy including but not limited to tolerance, dependence, constipation, hyperalgesia, cognitive side effects, addiction and possible death due to overuse and or misuse were discussed. I also discussed that such medications when co-administered with other sedative agents including but not limited to alcohol, benzodiazepines, sedative hypnotics and illegal drugs could pose life threatening consequences including death.  I also explained the impact that the administration of such medication has on a patient with obstructive sleep apnea and continued recommendations for use of apnea devices if ordered are prescribed by other physicians.  In order to effectively and safely treat the pain, I also emphasized the importance of compliance with the treatment plan as well as compliance with the terms of the opioid agreement which was reviewed in detail. I explained the importance of being responsible with the medications and to take these only as prescribed, never in excess and never for reasons other than pain reduction. The patient was counseled on keeping the medications safe and locked away from children and other adults as well as disposal methods and options. The patient understood the risks and instructions.      I also discussed with the patient in detail that based on the clinical response to the opioid medications and improvements of activities of daily living, sleep, and work performance in light of compliance with the  treatment plan we can continue this form of therapy for the above chronic  pain.  The goal and rationale used for current treatment with chronic opioid medication is to control the pain and alleviate disability induced by the chronic pain condition noted above after failures of other non-opioid and nonpharmacological modalities to treat the chronic pain and the symptoms associated with have failed.  The patient understood the goals in terms of the above treatment plan and had no further questions prior to leaving the office today.    Given the patient's total MED, general use of daily opiates, or other coadministered medications in various classes the patient was offered a prescription for Narcan.  I instructed the patient that Narcan is for emergency use only and is worse suspected or known opiate overdose.  Call 911 prior to administration of this medication to activate the emergency response team.  It is imperative to fill this medication in order to demonstrate understanding of the gravity of possible side effects including respiratory depression and risk of overdose of this opiate load or medication combination.  As such patients will be required to bring Narcan prescriptions to follow-up appointments as part of the compliance with continued opiate care.    Disclaimer: This note was transcribed using an audio transcription device.  As such, minor errors may be present with regard to spelling, punctuation, and inadvertent word insertion.  Please disregard such errors.

## 2024-10-16 NOTE — PROGRESS NOTES
Subjective   Patient ID: Nayely Barrera is a 69 y.o. female who presents for chronic oropharyngeal pain related to cancer, right shoulder pain and low back pain  HPI    69-year-old female with history of oropharyngeal cancer s/p resection and radiation treatment resulting in chronic dysphagia and mouth pain/throat pain as well as chronic low back pain, polyarticular pain presents for follow-up. Polyarticular pain most bothersome to her right shoulder. She had a previous fall which resulted in right-sided rib pain status post fracture. Also recent diagnosis of osteoporosis and is currently being treated with Reclast. Maintained on immediate release liquid morphine due to difficulty swallowing status post radiation treatment. Also continues to utilize Marinol for anorexia and duloxetine.  Patient presents at today's office visit for routine follow-up.  Continues to experience chronic mouth/throat pain with swallowing and most intense while eating.  Mouth pain/throat pain accompanied by chronic dryness.  Also experiencing chronic polyarticular pain which remains most bothersome to her right shoulder.  Pain from right rib fracture improving slowly.  Low back pain which can radiate to her right hip and occasionally to the level of her knee as well as pelvic pain.  Chronic numbness and tingling in her right lower extremity unchanged from prior exam.  Denies any increasing weakness or changes in bowel/bladder function.  Describes her pain as aching, throbbing and occasionally burning.  Oral pain increased with eating.  Oral pain also accompanied by tongue burning.  Utilizing a mouth moisturizer.  Restricted to a soft diet.  Continues close follow-up with oncology.  Polyarticular pain and low back pain increased with more activity including standing, bending and twisting.  She is doing a good job maintaining her weight.  Continues to supplement with Ensure.  Using Marinol sparingl as she has noted it causes increasing  "burning in her mouth.  Managing her pain with MS IR oral solution 10 mg up to 4 times per day as needed.  She tries to limit her use to 3 times per day and will often try taking 5 to 7.5 mg instead of the 10 mg.  Continued relief with duloxetine 60 mg daily.  She will do home therapy exercises and stretches to increase her strength and endurance.  She feels that this medication regimen allows her to remain active and to stay functional.    Location of Pain: Patient returns to the office for interval re-evaluation of \"right shoulder pain that radiates down right arm to elbow and up neck, low right back pain/right hip radiates to the knee, mouth pain from cancer, pelvic pain due to fx around February\" stated pain complaints.      Pain Score: 5/10     Treatment: Morphine Sulfate Oral Solution IR 0.5 ml (10mg) up to 4 times a day   Medication Count: 18 ml left in rx bottle  Fill Date: 9/16/24? per patient- label is too worn to see any information  Last Dose:  10/16/24 1 dose and take up to 4 doses a day but usually keeps it at 3  Efficacy: 50% for 4hrs  Side Effects: Denies     Narcan: Pt brought unopened Narcan to today's visit. EXP. 6/2024-gave harm reduction pamphlet and forgot to get would like it sent to pharmacy     Other treatment plans-medications/neuromodulators: Duloxetine 60mg-needs refill     Injections and/or Procedures: none     Pregnant: n/a     Pt sts, \"I'm satisfied with my current plan of care\"  OARRS:  Kamini Sawant, APRN-CNP, APRN-CNS on 10/16/2024 12:23 PM  I have personally reviewed the OARRS report for Nayely WOOD Barrera. I have considered the risks of abuse, dependence, addiction and diversion    Is the patient prescribed a combination of a benzodiazepine and opioid?  No    Last Urine Drug Screen / ordered today: No  Recent Results (from the past 8760 hours)   Confirmation Opiate/Opioid/Benzo Prescription Compliance    Collection Time: 07/31/24  3:31 PM   Result Value Ref Range    Clonazepam <25 " <25 ng/mL    7-Aminoclonazepam <25 <25 ng/mL    Alprazolam <25 <25 ng/mL    Alpha-Hydroxyalprazolam <25 <25 ng/mL    Midazolam <25 <25 ng/mL    Alpha-Hydroxymidazolam <25 <25 ng/mL    Chlordiazepoxide <25 <25 ng/mL    Diazepam <25 <25 ng/mL    Nordiazepam <25 <25 ng/mL    Temazepam <25 <25 ng/mL    Oxazepam <25 <25 ng/mL    Lorazepam <25 <25 ng/mL    Methadone <25 <25 ng/mL    EDDP <25 <25 ng/mL    6-Acetylmorphine <25 <25 ng/mL    Codeine <50 <50 ng/mL    Hydrocodone <25 <25 ng/mL    Hydromorphone 137 (H) <25 ng/mL    Morphine  >2,500 (H) <50 ng/mL    Norhydrocodone <25 <25 ng/mL    Noroxycodone <25 <25 ng/mL    Oxycodone <25 <25 ng/mL    Oxymorphone <25 <25 ng/mL    Fentanyl <2.5 <2.5 ng/mL    Norfentanyl <2.5 <2.5 ng/mL    Tramadol <50 <50 ng/mL    O-Desmethyltramadol <50 <50 ng/mL    Zolpidem <25 <25 ng/mL    Zolpidem Metabolite (ZCA) <25 <25 ng/mL   Screen Opiate/Opioid/Benzo Prescription Compliance    Collection Time: 07/31/24  3:31 PM   Result Value Ref Range    Creatinine, Urine Random 56.9 20.0 - 320.0 mg/dL    Amphetamine Screen, Urine Presumptive Negative Presumptive Negative    Barbiturate Screen, Urine Presumptive Negative Presumptive Negative    Cannabinoid Screen, Urine Presumptive Negative Presumptive Negative    Cocaine Metabolite Screen, Urine Presumptive Negative Presumptive Negative    PCP Screen, Urine Presumptive Negative Presumptive Negative   Buprenorphine Confirm,Urine    Collection Time: 07/31/24  3:31 PM   Result Value Ref Range    BUPRENORPHINE GLUC, URINE <5 ng/mL    BUPRENORPHINE ,URINE <2 ng/mL    NALOXONE, URINE <100 ng/mL    NORBUPRENORPHINE GLUC,URINE <5 ng/mL    NORBUPRENORPHINE, URINE <2 ng/mL   Tapentadol Confirmation, Urine    Collection Time: 07/31/24  3:31 PM   Result Value Ref Range    Tapentadol <25 <25 ng/mL    N-Desmethyltapentadol <25 <25 ng/mL     Results are as expected.     Controlled Substance Agreement:  Date of the Last Agreement: 7/24/24  Reviewed Controlled  Substance Agreement including but not limited to the benefits, risks, and alternatives to treatment with a Controlled Substance medication(s).    Monitoring and compliance:    ORT:    PDUQ:    Office Agreement:      Review of Systems    ROS:   General: No fevers, chills, weight loss  Skin: Negative for lesions  Eyes: No acute vision changes  Ears: No vertigo  Nose, mouth, throat: Positive for chronic difficulty swallowing   Respiratory: No cough, shortness of breath, cyanosis  Cardiovascular: Negative for chest pain syncope or palpitation  Gastrointestinal: No constipation, nausea, vomiting  Neurological: Negative for headache, positive for: Paresthesia  Psychological: Negative for severe or debilitating anxiety, depression. Negative memory loss  Musculoskeletal: Positive for arthralgia, myalgia and pain  Endocrine: Negative for weight gain, appetite changes, excessive sweating  Allergy/immune: Negative    All 13 systems were reviewed and are within normal levels except as noted or in the history of present illness.  Positive or pertinent negative responses are noted or were in the history of present illness. As noted, the patient denies significant or impairing weakness in the bilateral upper and lower extremities, medication induced constipation, and bowel or bladder incontinence.     Current Outpatient Medications:     albuterol 90 mcg/actuation inhaler, Inhale 2 puffs every 4 hours if needed., Disp: , Rfl:     calcium carbonate/vitamin D3 (CALCIUM 500 + D, D3, ORAL), Take by mouth., Disp: , Rfl:     droNABinol (Syndros) 5 mg/mL solution, 0.5 mL EVERY 12 HOURS (route: oral), Disp: 30 mL, Rfl: 0    ibuprofen 200 mg tablet, Take by mouth 2 times a day., Disp: , Rfl:     levothyroxine (Synthroid, Levoxyl) 75 mcg tablet, Take 1 tablet (75 mcg) by mouth once daily in the morning. Take before meals. Take all by itself with water only and then wait at least 1 hour before having food or other medication., Disp: 90  "tablet, Rfl: 3    DULoxetine (Cymbalta) 60 mg DR capsule, TAKE ONE CAPSULE BY MOUTH DAILY. PATIENT MAY OPEN CAPSULE AND SPRINKLE IN FOOD., Disp: 30 capsule, Rfl: 5    [START ON 10/23/2024] morphine 20 mg/mL concentrated oral solution, Take 0.5 mL (10 mg) by mouth 4 times a day as needed for severe pain (7 - 10). Do not fill before October 23, 2024., Disp: 60 mL, Rfl: 0    naloxone (Narcan) 4 mg/0.1 mL nasal spray, Administer 1 spray (4 mg) into affected nostril(s) if needed for opioid reversal for up to 1 day. May repeat every 2-3 minutes if needed, alternating nostrils, until medical assistance becomes available., Disp: 2 each, Rfl: 0     No past medical history on file.     Past Surgical History:   Procedure Laterality Date    OTHER SURGICAL HISTORY  08/24/2022    Tubal ligation    OTHER SURGICAL HISTORY  08/24/2022    Cholecystectomy    OTHER SURGICAL HISTORY  08/24/2022    Hip surgery    OTHER SURGICAL HISTORY  08/24/2022    Tongue surgery    OTHER SURGICAL HISTORY  08/24/2022    Shoulder surgery        No family history on file.     No Known Allergies     Objective     Visit Vitals  /65   Pulse 78   Resp 16   Ht 1.651 m (5' 5\")   Wt 50.7 kg (111 lb 12.8 oz)   SpO2 97%   BMI 18.60 kg/m²   OB Status Postmenopausal   Smoking Status Never   BSA 1.52 m²        Physical Exam    PE:  General: Well-developed, well-nourished, no acute distress. The patient demonstrates no pain behavior, symptom magnification or overt drug-seeking behavior.  Eye: Pupils appropriate for room lighting  Neck/thyroid: No obvious goiter or enlargement of neck noted  Respiratory exam: Normal respiratory effort, unlabored respiration. No accessory muscle use noted  Cardiac exam: Bilateral radial pulses intact  Abdominal: Nondistended  Spine, lumbar: The patient is able to rise from a seated to standing position without hesitancy, push off, or delay. Gait is slow, deliberate and forward leaning. Tenderness to paraspinous musculature is " noted mid to lower thoracic region throughout her lumbar region right greater than left.  Minimal tenderness right posterior ribs.  Flexion and extension limited secondary to stiffness.  Ortho: Right shoulder: Pain with active/passive range of motion right shoulder.  Chronic elevation right shoulder.  Limited external rotation right shoulder  Neurologic exam: Muscle strength is antigravity in all 4 extremities.  Equal muscle strength bilateral upper and lower extremities 5/5.  Psychiatric exam: Judgment and insight normal, affect normal, speech is fluent, affect appropriate, demonstrating no signs of hypersomnolence, sedation, or confusion          Assessment/Plan   Problem List Items Addressed This Visit             ICD-10-CM    Cancer-related pain G89.3     69 year-old female with history of oropharyngeal cancer resulting in chronic cancer related pain as well as dysphagia. Patient also has chronic low back pain and polyarticular pain.  Right shoulder pain and low back pain remains stable and unchanged from previous exam.  Patient has noted continued improvement in right rib pain after recent fall fracturing her ribs.  Unfortunately patient had a second fall after falling down several steps resulting in a pelvic fracture and additional right rib fracture.  She states that she is also recovering from this fall with improvement in pain.  She denies any difficulty breathing.  Continues to experience oral pain/mouth pain most noted when she is eating.  Pain accompanied by dryness and burning.  Also experiencing widespread polyarticular pain which she states remains stable.  She is doing a good job maintaining her weight.  Treating oral dryness with mouth moisturizer and utilizing Ensure for weight gain.  Utilizing Marinol sparingly as it causes increasing burning in her mouth.  She continues to manage her pain with MS IR oral solution 10 mg up to 4 times per day, duloxetine 60 mg daily and Marinol 0.5 mL every 12  hours for appetite stimulation.  She tries to limit her MS IR to 2-3 times per day on most days and uses the fourth dose only when pain is more intense.  At this time I feel it is reasonable to continue with the same regimen.  Discussed with patient that it would be optimal for her to use the least amount of morphine possible to prevent hyperalgesia or tolerance in the future.  She states that she tries to limit her use of morphine and often times takes half the dose or 5 mg when her pain is more controlled.  Plan reviewed with patient at today's visit.      -Continue MS IR oral solution 10 mg every 6 hours as needed for pain.  We will continue this medication at this time as the patient feels it provides significant relief without adverse effects.  The patient tries to limit her use of this medication taking 2-3 times per day on most days and limit her dose to 5 to 7.5 mg when able.  -Continue duloxetine 60 mg daily.  Patient is able to open capsules and sprinkle in food. Currently tolerating this medication without adverse effects.  -Continue Marinol for appetite stimulation.  Patient will continue to work on weight gain.  -Patient will continue home  exercises consistently 5 to 6 days/week targeting increasing strength to lower extremities.  -Patient will follow-up in 3 months or sooner if needed.    MEDICAL DECISION MAKING:    My impressions and treatment recommendations were discussed in detail with the patient, who verbalized understanding and had no further questions prior to discharge. Given the patient's report of reduced pain and improved functional ability without adverse effects,  it is reasonable to continue MS IR oral solution 10 mg every 6 hours as needed.  The terms of the opioid agreement as well as the potential risks and adverse effects of the patient's medication regimen were discussed in detail. This includes if applicable due to dosage of medication permission to discuss and coordinate care  with other treatment providers relevant to the patients condition. The patient verbalized understanding.    Treatment goals were discussed in detail with the patient.  These goals include reduction of pain levels, improved levels of functioning, avoidance of medication side effect and lowest medication dose possible to achieve  these goals.  The patient was in full agreement with these goals.  Also discussed is the understanding that pain may not be eliminated by medication but that the goal of a better sustaining life through use of medication is appropriate.  Lifestyle modifications including weight management, stretching, diet, exercise and smoking are addressed at each office visit.  The patient provided a urine drug screen for routine monitoring and compliance.  This test may be given as frequently as every month based on the patient's individual opiate risk stratification and prescriber concerns for any aberrancies.  This test is indicated given the use of controlled substances for the patient's medical condition.  Unless otherwise noted the prior (s) urine drug testing results were consistent with prescribed medications.  There is no evidence of illicit drug use or additional medications ingested.     Risks and side effects of chronic opioid therapy including but not limited to tolerance, dependence, constipation, hyperalgesia, cognitive side effects, addiction and possible death due to overuse and or misuse were discussed. I also discussed that such medications when co-administered with other sedative agents including but not limited to alcohol, benzodiazepines, sedative hypnotics and illegal drugs could pose life threatening consequences including death.  I also explained the impact that the administration of such medication has on a patient with obstructive sleep apnea and continued recommendations for use of apnea devices if ordered are prescribed by other physicians.  In order to effectively and safely  treat the pain, I also emphasized the importance of compliance with the treatment plan as well as compliance with the terms of the opioid agreement which was reviewed in detail. I explained the importance of being responsible with the medications and to take these only as prescribed, never in excess and never for reasons other than pain reduction. The patient was counseled on keeping the medications safe and locked away from children and other adults as well as disposal methods and options. The patient understood the risks and instructions.      I also discussed with the patient in detail that based on the clinical response to the opioid medications and improvements of activities of daily living, sleep, and work performance in light of compliance with the treatment plan we can continue this form of therapy for the above chronic  pain.  The goal and rationale used for current treatment with chronic opioid medication is to control the pain and alleviate disability induced by the chronic pain condition noted above after failures of other non-opioid and nonpharmacological modalities to treat the chronic pain and the symptoms associated with have failed.  The patient understood the goals in terms of the above treatment plan and had no further questions prior to leaving the office today.    Given the patient's total MED, general use of daily opiates, or other coadministered medications in various classes the patient was offered a prescription for Narcan.  I instructed the patient that Narcan is for emergency use only and is worse suspected or known opiate overdose.  Call 911 prior to administration of this medication to activate the emergency response team.  It is imperative to fill this medication in order to demonstrate understanding of the gravity of possible side effects including respiratory depression and risk of overdose of this opiate load or medication combination.  As such patients will be required to bring  Narcan prescriptions to follow-up appointments as part of the compliance with continued opiate care.    Disclaimer: This note was transcribed using an audio transcription device.  As such, minor errors may be present with regard to spelling, punctuation, and inadvertent word insertion.  Please disregard such errors.                 Relevant Medications    DULoxetine (Cymbalta) 60 mg DR capsule    morphine 20 mg/mL concentrated oral solution (Start on 10/23/2024)    Facial pain R51.9    Relevant Medications    DULoxetine (Cymbalta) 60 mg DR capsule    morphine 20 mg/mL concentrated oral solution (Start on 10/23/2024)    Pain after radiation therapy R52, Y84.2    Polyarthralgia M25.50    Relevant Medications    DULoxetine (Cymbalta) 60 mg DR capsule    morphine 20 mg/mL concentrated oral solution (Start on 10/23/2024)     Other Visit Diagnoses         Codes    Long term prescription opiate use    -  Primary Z79.891    Relevant Medications    naloxone (Narcan) 4 mg/0.1 mL nasal spray

## 2024-11-21 DIAGNOSIS — R51.9 FACIAL PAIN: ICD-10-CM

## 2024-11-21 DIAGNOSIS — M25.50 POLYARTHRALGIA: ICD-10-CM

## 2024-11-21 DIAGNOSIS — G89.3 CANCER-RELATED PAIN: ICD-10-CM

## 2024-11-21 NOTE — TELEPHONE ENCOUNTER
Pt is requesting refill of      morphine 20 mg/mL concentrated oral solution  10 mg, 4 times daily PRN           Summary: Take 0.5 mL (10 mg) by mouth 4 times a day as needed for severe pain (7 - 10). Do not fill before October 23, 2024., Starting Wed 10/23/2024, Until Fri 11/22/2024 at 2359, Normal  Dose, Route, Frequency: 10 mg, oral, 4 times daily PRN                                                           LV:   10/16/24            NV:    1/8/25              OARRS reviewed with LFD:   10/23/24 #60/30 days                          Pended RX to TED Monson for transmission to pharmacy.

## 2024-11-22 RX ORDER — MORPHINE SULFATE 20 MG/ML
10 SOLUTION ORAL 4 TIMES DAILY PRN
Qty: 60 ML | Refills: 0 | Status: SHIPPED | OUTPATIENT
Start: 2024-11-22 | End: 2024-12-22

## 2024-11-22 NOTE — TELEPHONE ENCOUNTER
Okay to refill morphine 20 mg/mL taking 0.5 mL or 10 mg up to 4 times per day as needed for pain 60 mL for 30 days.

## 2024-12-18 ENCOUNTER — APPOINTMENT (OUTPATIENT)
Dept: OTOLARYNGOLOGY | Facility: CLINIC | Age: 69
End: 2024-12-18
Payer: MEDICARE

## 2024-12-24 DIAGNOSIS — G89.3 CANCER-RELATED PAIN: ICD-10-CM

## 2024-12-24 DIAGNOSIS — M25.50 POLYARTHRALGIA: ICD-10-CM

## 2024-12-24 DIAGNOSIS — R51.9 FACIAL PAIN: ICD-10-CM

## 2024-12-24 RX ORDER — MORPHINE SULFATE 20 MG/ML
10 SOLUTION ORAL 4 TIMES DAILY PRN
Qty: 60 ML | Refills: 0 | Status: SHIPPED | OUTPATIENT
Start: 2024-12-24 | End: 2025-01-23

## 2024-12-24 NOTE — TELEPHONE ENCOUNTER
Pt is requesting refill of  Morphine oral solution    Take 0.5 mL (10 mg) by mouth 4 times a day as needed for severe pain (7 - 10) 10 mg, oral, 4 times daily PRN   ELIZ Fiore                                                    LV:  10/16/24                  NV:   1/8/25               OARRS reviewed with LFD:   11/22/24 #60/30 days                        Pended RX to Dr. Leo for transmission to pharmacy

## 2025-01-08 ENCOUNTER — OFFICE VISIT (OUTPATIENT)
Dept: PAIN MEDICINE | Facility: HOSPITAL | Age: 70
End: 2025-01-08
Payer: MEDICARE

## 2025-01-08 VITALS
OXYGEN SATURATION: 97 % | HEART RATE: 80 BPM | BODY MASS INDEX: 19.08 KG/M2 | SYSTOLIC BLOOD PRESSURE: 139 MMHG | WEIGHT: 114.5 LBS | RESPIRATION RATE: 16 BRPM | DIASTOLIC BLOOD PRESSURE: 71 MMHG | HEIGHT: 65 IN

## 2025-01-08 DIAGNOSIS — M25.511 CHRONIC RIGHT SHOULDER PAIN: ICD-10-CM

## 2025-01-08 DIAGNOSIS — M25.551 PAIN OF RIGHT HIP: ICD-10-CM

## 2025-01-08 DIAGNOSIS — G89.3 CANCER-RELATED PAIN: ICD-10-CM

## 2025-01-08 DIAGNOSIS — G89.29 CHRONIC RIGHT SHOULDER PAIN: ICD-10-CM

## 2025-01-08 DIAGNOSIS — M54.41 CHRONIC RIGHT-SIDED LOW BACK PAIN WITH RIGHT-SIDED SCIATICA: ICD-10-CM

## 2025-01-08 DIAGNOSIS — G89.29 CHRONIC RIGHT-SIDED LOW BACK PAIN WITH RIGHT-SIDED SCIATICA: ICD-10-CM

## 2025-01-08 DIAGNOSIS — Z79.891 LONG TERM PRESCRIPTION OPIATE USE: Primary | ICD-10-CM

## 2025-01-08 DIAGNOSIS — M25.50 POLYARTHRALGIA: ICD-10-CM

## 2025-01-08 LAB
AMPHETAMINES UR QL SCN: NORMAL
BARBITURATES UR QL SCN: NORMAL
BZE UR QL SCN: NORMAL
CANNABINOIDS UR QL SCN: NORMAL
CREAT UR-MCNC: 22.4 MG/DL (ref 20–320)
ETHANOL ?TM UR-MCNC: <10 MG/DL
PCP UR QL SCN: NORMAL

## 2025-01-08 PROCEDURE — 80348 DRUG SCREENING BUPRENORPHINE: CPT | Performed by: CLINICAL NURSE SPECIALIST

## 2025-01-08 PROCEDURE — 1159F MED LIST DOCD IN RCRD: CPT | Performed by: CLINICAL NURSE SPECIALIST

## 2025-01-08 PROCEDURE — G2211 COMPLEX E/M VISIT ADD ON: HCPCS | Performed by: CLINICAL NURSE SPECIALIST

## 2025-01-08 PROCEDURE — 99214 OFFICE O/P EST MOD 30 MIN: CPT | Performed by: CLINICAL NURSE SPECIALIST

## 2025-01-08 PROCEDURE — 80307 DRUG TEST PRSMV CHEM ANLYZR: CPT | Mod: PORLAB | Performed by: CLINICAL NURSE SPECIALIST

## 2025-01-08 PROCEDURE — 80365 DRUG SCREENING OXYCODONE: CPT | Mod: PORLAB | Performed by: CLINICAL NURSE SPECIALIST

## 2025-01-08 PROCEDURE — 3008F BODY MASS INDEX DOCD: CPT | Performed by: CLINICAL NURSE SPECIALIST

## 2025-01-08 PROCEDURE — 1160F RVW MEDS BY RX/DR IN RCRD: CPT | Performed by: CLINICAL NURSE SPECIALIST

## 2025-01-08 PROCEDURE — 1036F TOBACCO NON-USER: CPT | Performed by: CLINICAL NURSE SPECIALIST

## 2025-01-08 PROCEDURE — 80372 DRUG SCREENING TAPENTADOL: CPT | Mod: PORLAB | Performed by: CLINICAL NURSE SPECIALIST

## 2025-01-08 RX ORDER — DIAZEPAM 5 MG/1
5 TABLET ORAL ONCE AS NEEDED
OUTPATIENT
Start: 2025-01-08

## 2025-01-08 ASSESSMENT — ENCOUNTER SYMPTOMS
DEPRESSION: 0
OCCASIONAL FEELINGS OF UNSTEADINESS: 0
LOSS OF SENSATION IN FEET: 0

## 2025-01-08 NOTE — ASSESSMENT & PLAN NOTE
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 Patient also has chronic low back pain and polyarticular pain.  Right shoulder pain and low back pain remains stable and unchanged from previous exam.  Patient most bothered at today's visit by an increase in her right hip pain.  Previous right femur fracture resulting in surgical repair.  Previous right hip injections with a different pain management clinic provided significant/sustained relief for multiple years.  She has recently noticed an increase in right hip pain and would like to discuss a right hip injection.  Continues to experience oral pain/mouth pain most noted when she is eating.  Pain accompanied by dryness and burning.  She is doing a good job maintaining her weight.  Treating oral dryness with mouth moisturizer and utilizing Ensure for weight gain.  Utilizing Marinol sparingly as it causes increasing burning in her mouth.  She continues to manage her pain with MS IR oral solution 10 mg up to 4 times per day, duloxetine 60 mg daily and Marinol 0.5 mL every 12 hours for appetite stimulation.  She tries to limit her MS IR to 2-3 times per day on most days and uses the fourth dose only when pain is more intense.  At this time I feel it is reasonable to continue with the same regimen.  As far as her hip pain, reviewed recent pelvic imaging with Dr. Leo who suggested the patient may benefit from proceeding with a right intra-articular hip injection.  Reviewed risks and benefits with patient and she would like to proceed with this injection.  Plan reviewed with patient at today's visit.    -Scheduled for right intra-articular hip injection under fluoroscopic guidance.  Reviewed risks and benefits with patient and she would like to proceed.  Patient will require Valium prior to her procedure for anxiety.  -Continue MS IR oral solution 10 mg every 6 hours as needed for pain.  We  will continue this medication at this time as the patient feels it provides significant relief without adverse effects.  The patient tries to limit her use of this medication taking 2-3 times per day on most days and limit her dose to 5 to 7.5 mg when able.  -Continue duloxetine 60 mg daily.  Patient is able to open capsules and sprinkle in food. Currently tolerating this medication without adverse effects.  -Continue Marinol for appetite stimulation.  Patient will continue to work on weight gain.  -Patient will continue home  exercises consistently 5 to 6 days/week targeting increasing strength to lower extremities.  -Patient will follow-up in 6-8 weeks after her injection for reevaluation.      MEDICAL DECISION MAKING:    My impressions and treatment recommendations were discussed in detail with the patient, who verbalized understanding and had no further questions prior to discharge. Given the patient's report of reduced pain and improved functional ability without adverse effects,  it is reasonable to continue MS IR oral solution 10 mg every 6 hours as needed.  The terms of the opioid agreement as well as the potential risks and adverse effects of the patient's medication regimen were discussed in detail. This includes if applicable due to dosage of medication permission to discuss and coordinate care with other treatment providers relevant to the patients condition. The patient verbalized understanding.    Treatment goals were discussed in detail with the patient.  These goals include reduction of pain levels, improved levels of functioning, avoidance of medication side effect and lowest medication dose possible to achieve  these goals.  The patient was in full agreement with these goals.  Also discussed is the understanding that pain may not be eliminated by medication but that the goal of a better sustaining life through use of medication is appropriate.  Lifestyle modifications including weight management,  stretching, diet, exercise and smoking are addressed at each office visit.  The patient provided a urine drug screen for routine monitoring and compliance.  This test may be given as frequently as every month based on the patient's individual opiate risk stratification and prescriber concerns for any aberrancies.  This test is indicated given the use of controlled substances for the patient's medical condition.  Unless otherwise noted the prior (s) urine drug testing results were consistent with prescribed medications.  There is no evidence of illicit drug use or additional medications ingested.     Risks and side effects of chronic opioid therapy including but not limited to tolerance, dependence, constipation, hyperalgesia, cognitive side effects, addiction and possible death due to overuse and or misuse were discussed. I also discussed that such medications when co-administered with other sedative agents including but not limited to alcohol, benzodiazepines, sedative hypnotics and illegal drugs could pose life threatening consequences including death.  I also explained the impact that the administration of such medication has on a patient with obstructive sleep apnea and continued recommendations for use of apnea devices if ordered are prescribed by other physicians.  In order to effectively and safely treat the pain, I also emphasized the importance of compliance with the treatment plan as well as compliance with the terms of the opioid agreement which was reviewed in detail. I explained the importance of being responsible with the medications and to take these only as prescribed, never in excess and never for reasons other than pain reduction. The patient was counseled on keeping the medications safe and locked away from children and other adults as well as disposal methods and options. The patient understood the risks and instructions.      I also discussed with the patient in detail that based on the clinical  response to the opioid medications and improvements of activities of daily living, sleep, and work performance in light of compliance with the treatment plan we can continue this form of therapy for the above chronic  pain.  The goal and rationale used for current treatment with chronic opioid medication is to control the pain and alleviate disability induced by the chronic pain condition noted above after failures of other non-opioid and nonpharmacological modalities to treat the chronic pain and the symptoms associated with have failed.  The patient understood the goals in terms of the above treatment plan and had no further questions prior to leaving the office today.    Given the patient's total MED, general use of daily opiates, or other coadministered medications in various classes the patient was offered a prescription for Narcan.  I instructed the patient that Narcan is for emergency use only and is worse suspected or known opiate overdose.  Call 911 prior to administration of this medication to activate the emergency response team.  It is imperative to fill this medication in order to demonstrate understanding of the gravity of possible side effects including respiratory depression and risk of overdose of this opiate load or medication combination.  As such patients will be required to bring Narcan prescriptions to follow-up appointments as part of the compliance with continued opiate care.

## 2025-01-08 NOTE — PROGRESS NOTES
Subjective   Patient ID: Nayely Barrera is a 69 y.o. female who presents for chronic oropharyngeal pain related to cancer, right shoulder pain, hip pain and low back pain.  HPI    69-year-old female with history of oropharyngeal cancer s/p resection and radiation treatment resulting in chronic mouth/throat pain/dysphagia as well as chronic low back and polyarticular pain presents for follow-up.  Polyarticular pain most bothersome to right shoulder and right hip.  Previous fall resulting in a right sided rib fracture.  Also recently diagnosed with osteoporosis and is being treated with Reclast.  Maintained on immediate release liquid morphine due to difficulty swallowing status post radiation treatment.  Continues to sparingly utilize Marinol for anorexia.  Continued on duloxetine.  Presenting at today's office visit for routine follow-up.  She continues to endorse chronic mouth/throat pain with difficulty swallowing intensified by eating.  Her mouth remains dry.  Continues to be limited to a liquid or soft diet due to mouth pain.  She does utilize a mouth moisturizer.  Oral pain accompanied by tongue burning.  Doing a good job maintaining her weight.  Continues to use Ensure.  Also continues to experience chronic polyarticular pain most bothersome to her right shoulder and her right hip at today's visit.  Low back pain may radiate to her right lower extremity to the level of her knee.  Chronic numbness and tingling in the right lower extremity unchanged from prior exam.  Denies any increasing weakness or changes in bowel/bladder function.  She is most bothered at today's visit by her right hip pain which has increased recently.  Previously did well with right hip injections at a different pain clinic. Pain radiating from her right hip into her right groin.  Pain is aching and throbbing. Standing and walking increase low back and right hip pain.  Continues to do well with MS IR 10 mg up to 4 times per day as  "needed.  Patient states she tries to take only 3 times per day at most and will often take a half dose.  Continued relief with duloxetine 60 mg daily.  She is doing home therapy stretches and exercises, however, has been unable to do much recently secondary to increasing hip pain.    Location of Pain: Patient returns to the office for interval re-evaluation of \"right shoulder pain that radiates down right arm to elbow and up neck, low right back pain/right hip radiates to the knee, mouth pain from cancer, pelvic pain due to fx around February\" stated pain complaints.      Pain Score: 4/10     Treatment: Morphine Sulfate Oral Solution IR 0.5 ml (10mg) up to 4 times a day   Medication Count:14 ml left in rx bottle + full opened bottle  Fill Date: 12/24/24  Last Dose: 1/8/25 1 dose and take up to 4 doses a day but usually keeps it at 3  Efficacy: 50% for 4hrs  Side Effects: Denies     Narcan: Pt brought unopened Narcan to today's visit. EXP. 2/2026     Other treatment plans-medications/neuromodulators: Duloxetine 60mg-no refill needed     Injections and/or Procedures: none     Pregnant: n/a     Pt sts, \"I'm satisfied with my current plan of care\"                  OARRS:  No data recorded  I have personally reviewed the OARRS report for Nayely Barrera. I have considered the risks of abuse, dependence, addiction and diversion    Is the patient prescribed a combination of a benzodiazepine and opioid?  No    Last Urine Drug Screen / ordered today: Yes  Recent Results (from the past 8760 hours)   Confirmation Opiate/Opioid/Benzo Prescription Compliance    Collection Time: 07/31/24  3:31 PM   Result Value Ref Range    Clonazepam <25 <25 ng/mL    7-Aminoclonazepam <25 <25 ng/mL    Alprazolam <25 <25 ng/mL    Alpha-Hydroxyalprazolam <25 <25 ng/mL    Midazolam <25 <25 ng/mL    Alpha-Hydroxymidazolam <25 <25 ng/mL    Chlordiazepoxide <25 <25 ng/mL    Diazepam <25 <25 ng/mL    Nordiazepam <25 <25 ng/mL    Temazepam <25 <25 " ng/mL    Oxazepam <25 <25 ng/mL    Lorazepam <25 <25 ng/mL    Methadone <25 <25 ng/mL    EDDP <25 <25 ng/mL    6-Acetylmorphine <25 <25 ng/mL    Codeine <50 <50 ng/mL    Hydrocodone <25 <25 ng/mL    Hydromorphone 137 (H) <25 ng/mL    Morphine  >2,500 (H) <50 ng/mL    Norhydrocodone <25 <25 ng/mL    Noroxycodone <25 <25 ng/mL    Oxycodone <25 <25 ng/mL    Oxymorphone <25 <25 ng/mL    Fentanyl <2.5 <2.5 ng/mL    Norfentanyl <2.5 <2.5 ng/mL    Tramadol <50 <50 ng/mL    O-Desmethyltramadol <50 <50 ng/mL    Zolpidem <25 <25 ng/mL    Zolpidem Metabolite (ZCA) <25 <25 ng/mL   Screen Opiate/Opioid/Benzo Prescription Compliance    Collection Time: 07/31/24  3:31 PM   Result Value Ref Range    Creatinine, Urine Random 56.9 20.0 - 320.0 mg/dL    Amphetamine Screen, Urine Presumptive Negative Presumptive Negative    Barbiturate Screen, Urine Presumptive Negative Presumptive Negative    Cannabinoid Screen, Urine Presumptive Negative Presumptive Negative    Cocaine Metabolite Screen, Urine Presumptive Negative Presumptive Negative    PCP Screen, Urine Presumptive Negative Presumptive Negative   Buprenorphine Confirm,Urine    Collection Time: 07/31/24  3:31 PM   Result Value Ref Range    BUPRENORPHINE GLUC, URINE <5 ng/mL    BUPRENORPHINE ,URINE <2 ng/mL    NALOXONE, URINE <100 ng/mL    NORBUPRENORPHINE GLUC,URINE <5 ng/mL    NORBUPRENORPHINE, URINE <2 ng/mL   Tapentadol Confirmation, Urine    Collection Time: 07/31/24  3:31 PM   Result Value Ref Range    Tapentadol <25 <25 ng/mL    N-Desmethyltapentadol <25 <25 ng/mL     Results are as expected.     Controlled Substance Agreement:  Date of the Last Agreement: 7/24/24  Reviewed Controlled Substance Agreement including but not limited to the benefits, risks, and alternatives to treatment with a Controlled Substance medication(s).        Review of Systems      ROS:   General: No fevers, chills, weight loss  Skin: Negative for lesions  Eyes: No acute vision changes  Ears: No  vertigo  Nose, mouth, throat: Positive for difficulty swallowing, throat pain and dryness   Respiratory: No cough, shortness of breath, cyanosis  Cardiovascular: Negative for chest pain syncope or palpitation  Gastrointestinal: No constipation, nausea, vomiting  Neurological: Negative for headache, positive for: Paresthesia  Psychological: Negative for severe or debilitating anxiety, depression. Negative memory loss  Musculoskeletal: Positive for arthralgia, myalgia and pain  Endocrine: Negative for weight gain, appetite changes, excessive sweating  Allergy/immune: Negative    All 13 systems were reviewed and are within normal levels except as noted or in the history of present illness.  Positive or pertinent negative responses are noted or were in the history of present illness. As noted, the patient denies significant or impairing weakness in the bilateral upper and lower extremities, medication induced constipation, and bowel or bladder incontinence.     Current Outpatient Medications   Medication Instructions    albuterol 90 mcg/actuation inhaler 2 puffs, Every 4 hours PRN    calcium carbonate/vitamin D3 (CALCIUM 500 + D, D3, ORAL) Take by mouth.    droNABinol (Syndros) 5 mg/mL solution 0.5 mL EVERY 12 HOURS (route: oral)    DULoxetine (Cymbalta) 60 mg DR capsule TAKE ONE CAPSULE BY MOUTH DAILY. PATIENT MAY OPEN CAPSULE AND SPRINKLE IN FOOD.    ibuprofen 200 mg tablet 2 times daily    levothyroxine (SYNTHROID, LEVOXYL) 75 mcg, oral, Daily before breakfast, Take all by itself with water only and then wait at least 1 hour before having food or other medication.    morphine 10 mg, oral, 4 times daily PRN        No past medical history on file.     Past Surgical History:   Procedure Laterality Date    OTHER SURGICAL HISTORY  08/24/2022    Tubal ligation    OTHER SURGICAL HISTORY  08/24/2022    Cholecystectomy    OTHER SURGICAL HISTORY  08/24/2022    Hip surgery    OTHER SURGICAL HISTORY  08/24/2022    Tongue surgery     OTHER SURGICAL HISTORY  08/24/2022    Shoulder surgery        No family history on file.     No Known Allergies     No results found for this or any previous visit from the past 1000 days.      Objective     There were no vitals filed for this visit.            Physical Exam    GENERAL EXAM  Vital Signs: Vital signs to include heart rate, respiration rate, blood pressure, and temperature were reviewed.  General Appearance:  Awake, alert, healthy appearing, well developed, No acute distress.  Head: Normocephalic without evidence of head injury.  Neck: The appearance of the neck was normal without swelling with a midline trachea.  Eyes: The eyelids and eyebrows exhibited no abnormalities.  Pupils were not pin-point.  Sclera was without icterus.  Lungs: Respiration rhythm and depth was normal.  Respiratory movements were normal without labored breathing.  Cardiovascular: No peripheral edema was present.    Spine, lumbar: The patient is able to rise from seated to standing position with difficulty secondary to right hip pain.  Gait is slow and limping favoring right lower extremity.  Tenderness to paraspinous musculature mid to lower thoracic region throughout her lumbar region.  Flexion and extension limited secondary to stiffness.  Ortho: Right shoulder: Pain with active/passive range of motion right shoulder.  Chronic elevation right shoulder.  Right hip: Significant pain with limited internal and external rotation.  Positive logroll on the right.  Neurological: Patient was oriented to time, place, and person.  Speech was normal.     Psychiatric: Appearance was normal with appropriate dress.  Mood was euthymic and affect was normal.  Skin: Affected regions were without ecchymosis or skin lesions.        Assessment/Plan   Problem List Items Addressed This Visit             ICD-10-CM    Cancer-related pain G89.3     69 year-old female with history of oropharyngeal cancer resulting in chronic cancer related oral pain  as well as dysphagia. Patient also has chronic low back pain and polyarticular pain.  Right shoulder pain and low back pain remains stable and unchanged from previous exam.  Patient most bothered at today's visit by an increase in her right hip pain.  Previous right femur fracture resulting in surgical repair.  Previous right hip injections with a different pain management clinic provided significant/sustained relief for multiple years.  She has recently noticed an increase in right hip pain and would like to discuss a right hip injection.  Continues to experience oral pain/mouth pain most noted when she is eating.  Pain accompanied by dryness and burning.  She is doing a good job maintaining her weight.  Treating oral dryness with mouth moisturizer and utilizing Ensure for weight gain.  Utilizing Marinol sparingly as it causes increasing burning in her mouth.  She continues to manage her pain with MS IR oral solution 10 mg up to 4 times per day, duloxetine 60 mg daily and Marinol 0.5 mL every 12 hours for appetite stimulation.  She tries to limit her MS IR to 2-3 times per day on most days and uses the fourth dose only when pain is more intense.  At this time I feel it is reasonable to continue with the same regimen.  As far as her hip pain, reviewed recent pelvic imaging with Dr. Leo who suggested the patient may benefit from proceeding with a right intra-articular hip injection.  Reviewed risks and benefits with patient and she would like to proceed with this injection.  Plan reviewed with patient at today's visit.    -Scheduled for right intra-articular hip injection under fluoroscopic guidance.  Reviewed risks and benefits with patient and she would like to proceed.  Patient will require Valium prior to her procedure for anxiety.  -Continue MS IR oral solution 10 mg every 6 hours as needed for pain.  We will continue this medication at this time as the patient feels it provides significant relief without  adverse effects.  The patient tries to limit her use of this medication taking 2-3 times per day on most days and limit her dose to 5 to 7.5 mg when able.  -Continue duloxetine 60 mg daily.  Patient is able to open capsules and sprinkle in food. Currently tolerating this medication without adverse effects.  -Continue Marinol for appetite stimulation.  Patient will continue to work on weight gain.  -Patient will continue home  exercises consistently 5 to 6 days/week targeting increasing strength to lower extremities.  -Patient will follow-up in 6-8 weeks after her injection for reevaluation.      MEDICAL DECISION MAKING:    My impressions and treatment recommendations were discussed in detail with the patient, who verbalized understanding and had no further questions prior to discharge. Given the patient's report of reduced pain and improved functional ability without adverse effects,  it is reasonable to continue MS IR oral solution 10 mg every 6 hours as needed.  The terms of the opioid agreement as well as the potential risks and adverse effects of the patient's medication regimen were discussed in detail. This includes if applicable due to dosage of medication permission to discuss and coordinate care with other treatment providers relevant to the patients condition. The patient verbalized understanding.    Treatment goals were discussed in detail with the patient.  These goals include reduction of pain levels, improved levels of functioning, avoidance of medication side effect and lowest medication dose possible to achieve  these goals.  The patient was in full agreement with these goals.  Also discussed is the understanding that pain may not be eliminated by medication but that the goal of a better sustaining life through use of medication is appropriate.  Lifestyle modifications including weight management, stretching, diet, exercise and smoking are addressed at each office visit.  The patient provided a urine  drug screen for routine monitoring and compliance.  This test may be given as frequently as every month based on the patient's individual opiate risk stratification and prescriber concerns for any aberrancies.  This test is indicated given the use of controlled substances for the patient's medical condition.  Unless otherwise noted the prior (s) urine drug testing results were consistent with prescribed medications.  There is no evidence of illicit drug use or additional medications ingested.     Risks and side effects of chronic opioid therapy including but not limited to tolerance, dependence, constipation, hyperalgesia, cognitive side effects, addiction and possible death due to overuse and or misuse were discussed. I also discussed that such medications when co-administered with other sedative agents including but not limited to alcohol, benzodiazepines, sedative hypnotics and illegal drugs could pose life threatening consequences including death.  I also explained the impact that the administration of such medication has on a patient with obstructive sleep apnea and continued recommendations for use of apnea devices if ordered are prescribed by other physicians.  In order to effectively and safely treat the pain, I also emphasized the importance of compliance with the treatment plan as well as compliance with the terms of the opioid agreement which was reviewed in detail. I explained the importance of being responsible with the medications and to take these only as prescribed, never in excess and never for reasons other than pain reduction. The patient was counseled on keeping the medications safe and locked away from children and other adults as well as disposal methods and options. The patient understood the risks and instructions.      I also discussed with the patient in detail that based on the clinical response to the opioid medications and improvements of activities of daily living, sleep, and work  performance in light of compliance with the treatment plan we can continue this form of therapy for the above chronic  pain.  The goal and rationale used for current treatment with chronic opioid medication is to control the pain and alleviate disability induced by the chronic pain condition noted above after failures of other non-opioid and nonpharmacological modalities to treat the chronic pain and the symptoms associated with have failed.  The patient understood the goals in terms of the above treatment plan and had no further questions prior to leaving the office today.    Given the patient's total MED, general use of daily opiates, or other coadministered medications in various classes the patient was offered a prescription for Narcan.  I instructed the patient that Narcan is for emergency use only and is worse suspected or known opiate overdose.  Call 911 prior to administration of this medication to activate the emergency response team.  It is imperative to fill this medication in order to demonstrate understanding of the gravity of possible side effects including respiratory depression and risk of overdose of this opiate load or medication combination.  As such patients will be required to bring Narcan prescriptions to follow-up appointments as part of the compliance with continued opiate care.                 Lumbago M54.50    Polyarthralgia M25.50    Right shoulder pain M25.511     Other Visit Diagnoses         Codes    Long term prescription opiate use    -  Primary Z79.891    Relevant Orders    Buprenorphine Confirm,Urine    Tapentadol Confirmation, Urine    Alcohol, Urine    Opiate/Opioid/Benzo Prescription Compliance    OOB Internal Tracking    Pain of right hip     M25.551    Relevant Orders    [place] pain management    Joint Injection/Aspiration                   This note was generated with the aid of dictation software, there may be typos despite my attempts at proofreading.

## 2025-01-10 LAB
1OH-MIDAZOLAM UR CFM-MCNC: <25 NG/ML
6MAM UR CFM-MCNC: <25 NG/ML
7AMINOCLONAZEPAM UR CFM-MCNC: <25 NG/ML
A-OH ALPRAZ UR CFM-MCNC: <25 NG/ML
ALPRAZ UR CFM-MCNC: <25 NG/ML
CHLORDIAZEP UR CFM-MCNC: <25 NG/ML
CLONAZEPAM UR CFM-MCNC: <25 NG/ML
CODEINE UR CFM-MCNC: <50 NG/ML
DIAZEPAM UR CFM-MCNC: <25 NG/ML
EDDP UR CFM-MCNC: <25 NG/ML
FENTANYL UR CFM-MCNC: <2.5 NG/ML
HYDROCODONE CTO UR CFM-MCNC: <25 NG/ML
HYDROMORPHONE UR CFM-MCNC: 69 NG/ML
LORAZEPAM UR CFM-MCNC: <25 NG/ML
METHADONE UR CFM-MCNC: <25 NG/ML
MIDAZOLAM UR CFM-MCNC: <25 NG/ML
MORPHINE UR CFM-MCNC: >2500 NG/ML
NORDIAZEPAM UR CFM-MCNC: <25 NG/ML
NORFENTANYL UR CFM-MCNC: <2.5 NG/ML
NORHYDROCODONE UR CFM-MCNC: <25 NG/ML
NOROXYCODONE UR CFM-MCNC: <25 NG/ML
NORTAPENTADOL UR CFM-MCNC: <25 NG/ML
NORTRAMADOL UR-MCNC: <50 NG/ML
OXAZEPAM UR CFM-MCNC: <25 NG/ML
OXYCODONE UR CFM-MCNC: <25 NG/ML
OXYMORPHONE UR CFM-MCNC: <25 NG/ML
TAPENTADOL UR CFM-MCNC: <25 NG/ML
TEMAZEPAM UR CFM-MCNC: <25 NG/ML
TRAMADOL UR CFM-MCNC: <50 NG/ML
ZOLPIDEM UR CFM-MCNC: <25 NG/ML
ZOLPIDEM UR-MCNC: <25 NG/ML

## 2025-01-24 ENCOUNTER — APPOINTMENT (OUTPATIENT)
Dept: GASTROENTEROLOGY | Facility: HOSPITAL | Age: 70
End: 2025-01-24
Payer: MEDICARE

## 2025-01-28 NOTE — PROGRESS NOTES
Provider Impressions     Status post surgery and radiation therapy for an anterior tongue cancer. I cannot appreciate any evidence of tumor recurrence.      Multiple pulmonary nodules which have been stable for years.  This is most likely benign.    Worsening dysphagia.  After discussion we agreed to get a modified barium swallow.  I will call her afterwards.    Hypothyroidism.  She has been on Synthroid.  This is being managed at home.    I will see her in 6 months.        Chief Complaint     Follow-up status post surgery for the management of a tongue cancer      History of Present Illness    This lady was seen in October 2020 at the request of a local colleague. She was found to have a tongue cancer. On November 3, 2020 she underwent surgery. The margins at the time of the surgery were described as being negative. There was no evidence of any metastatic disease. On final pathology there was an area with severe dysplasia. She was presented at our tumor board and it was felt that she should undergo radiation. This was completed in late February 2021. The patient had significant discomfort and could not complete the entire treatment. She missed the last week. She stopped smoking in 2018. She had a TSH level done in August 2024 which was 4.20.  She is on thyroid medication.  She had a CT scan of the chest done in August 2024. It did not show any significant changes from previously. It did show multiple subcentimeter nodules.  This is not different than the prior scans.  She has more issues with swallowing now.  It seems to have been worse over the past 2 or 3 months.    Physical Exam    Examination of the oral cavity and oropharynx shows good healing of the surgical site. There is no evidence of any suspicious mucosal lesions. There is good tongue mobility. There is good mandibular excursion. Palpation of the parotid, neck, thyroid feel fails to show any worrisome masses or adenopathies.      A flexible laryngoscopy  was carried out. Under topical Xylocaine and John-Synephrine the scope was introduced through the nostril. The nasopharynx, base of tongue, hypopharynx, and larynx are visualized. The vocal cords are normally mobile. There is no pooling of secretions in the piriform sinuses. There is no evidence of any mucosal lesions.

## 2025-01-29 ENCOUNTER — APPOINTMENT (OUTPATIENT)
Dept: OTOLARYNGOLOGY | Facility: CLINIC | Age: 70
End: 2025-01-29
Payer: MEDICARE

## 2025-01-29 VITALS — TEMPERATURE: 98.5 F | HEIGHT: 65 IN | BODY MASS INDEX: 18.83 KG/M2 | WEIGHT: 113 LBS

## 2025-01-29 DIAGNOSIS — C02.3 CANCER OF ANTERIOR TWO-THIRDS OF TONGUE (MULTI): Primary | ICD-10-CM

## 2025-01-29 DIAGNOSIS — R51.9 FACIAL PAIN: ICD-10-CM

## 2025-01-29 DIAGNOSIS — M25.50 POLYARTHRALGIA: ICD-10-CM

## 2025-01-29 DIAGNOSIS — E03.9 ACQUIRED HYPOTHYROIDISM: ICD-10-CM

## 2025-01-29 DIAGNOSIS — G89.3 CANCER-RELATED PAIN: ICD-10-CM

## 2025-01-29 DIAGNOSIS — R13.12 DYSPHAGIA, OROPHARYNGEAL PHASE: ICD-10-CM

## 2025-01-29 PROCEDURE — 1159F MED LIST DOCD IN RCRD: CPT | Performed by: OTOLARYNGOLOGY

## 2025-01-29 PROCEDURE — 1160F RVW MEDS BY RX/DR IN RCRD: CPT | Performed by: OTOLARYNGOLOGY

## 2025-01-29 PROCEDURE — 99213 OFFICE O/P EST LOW 20 MIN: CPT | Performed by: OTOLARYNGOLOGY

## 2025-01-29 PROCEDURE — 31575 DIAGNOSTIC LARYNGOSCOPY: CPT | Performed by: OTOLARYNGOLOGY

## 2025-01-29 PROCEDURE — 3008F BODY MASS INDEX DOCD: CPT | Performed by: OTOLARYNGOLOGY

## 2025-01-29 PROCEDURE — 1036F TOBACCO NON-USER: CPT | Performed by: OTOLARYNGOLOGY

## 2025-01-29 NOTE — TELEPHONE ENCOUNTER
Pt is requesting refill of  morphine 20 mg/mL - 10 mg po QID  [place]                                                         LV:    1/8/25                 NV:   3/5/25               OARRS reviewed with L[facility]:    12/24/24 #60/30 days                        Pended RX to [facility] for transmission to pharmacy.

## 2025-01-30 RX ORDER — MORPHINE SULFATE 20 MG/ML
10 SOLUTION ORAL 4 TIMES DAILY PRN
Qty: 60 ML | Refills: 0 | Status: SHIPPED | OUTPATIENT
Start: 2025-01-30 | End: 2025-03-01

## 2025-02-07 ENCOUNTER — HOSPITAL ENCOUNTER (OUTPATIENT)
Dept: GASTROENTEROLOGY | Facility: HOSPITAL | Age: 70
Discharge: HOME | End: 2025-02-07
Payer: MEDICARE

## 2025-02-07 VITALS
SYSTOLIC BLOOD PRESSURE: 144 MMHG | WEIGHT: 112 LBS | RESPIRATION RATE: 16 BRPM | DIASTOLIC BLOOD PRESSURE: 61 MMHG | HEIGHT: 65 IN | HEART RATE: 61 BPM | OXYGEN SATURATION: 98 % | TEMPERATURE: 97.8 F | BODY MASS INDEX: 18.66 KG/M2

## 2025-02-07 DIAGNOSIS — M25.551 PAIN OF RIGHT HIP: ICD-10-CM

## 2025-02-07 PROCEDURE — 20610 DRAIN/INJ JOINT/BURSA W/O US: CPT | Performed by: PHYSICAL MEDICINE & REHABILITATION

## 2025-02-07 PROCEDURE — 2500000004 HC RX 250 GENERAL PHARMACY W/ HCPCS (ALT 636 FOR OP/ED): Mod: JZ | Performed by: PHYSICAL MEDICINE & REHABILITATION

## 2025-02-07 PROCEDURE — 2550000001 HC RX 255 CONTRASTS: Performed by: PHYSICAL MEDICINE & REHABILITATION

## 2025-02-07 PROCEDURE — 77002 NEEDLE LOCALIZATION BY XRAY: CPT | Performed by: PHYSICAL MEDICINE & REHABILITATION

## 2025-02-07 RX ORDER — METHYLPREDNISOLONE ACETATE 40 MG/ML
INJECTION, SUSPENSION INTRA-ARTICULAR; INTRALESIONAL; INTRAMUSCULAR; SOFT TISSUE AS NEEDED
Status: COMPLETED | OUTPATIENT
Start: 2025-02-07 | End: 2025-02-07

## 2025-02-07 RX ORDER — LIDOCAINE HYDROCHLORIDE 5 MG/ML
INJECTION, SOLUTION INFILTRATION; INTRAVENOUS AS NEEDED
Status: COMPLETED | OUTPATIENT
Start: 2025-02-07 | End: 2025-02-07

## 2025-02-07 RX ADMIN — LIDOCAINE HYDROCHLORIDE 15 ML: 5 INJECTION, SOLUTION INFILTRATION at 09:18

## 2025-02-07 RX ADMIN — METHYLPREDNISOLONE ACETATE 40 MG: 40 INJECTION, SUSPENSION INTRA-ARTICULAR; INTRALESIONAL; INTRAMUSCULAR; SOFT TISSUE at 09:19

## 2025-02-07 RX ADMIN — IOHEXOL 5 ML: 350 INJECTION, SOLUTION INTRAVENOUS at 09:18

## 2025-02-07 ASSESSMENT — COLUMBIA-SUICIDE SEVERITY RATING SCALE - C-SSRS
2. HAVE YOU ACTUALLY HAD ANY THOUGHTS OF KILLING YOURSELF?: NO
1. IN THE PAST MONTH, HAVE YOU WISHED YOU WERE DEAD OR WISHED YOU COULD GO TO SLEEP AND NOT WAKE UP?: NO
6. HAVE YOU EVER DONE ANYTHING, STARTED TO DO ANYTHING, OR PREPARED TO DO ANYTHING TO END YOUR LIFE?: NO

## 2025-02-07 ASSESSMENT — PAIN - FUNCTIONAL ASSESSMENT: PAIN_FUNCTIONAL_ASSESSMENT: 0-10

## 2025-02-07 ASSESSMENT — PAIN SCALES - GENERAL
PAINLEVEL_OUTOF10: 4
PAINLEVEL_OUTOF10: 4

## 2025-02-07 NOTE — DISCHARGE INSTRUCTIONS
You had a pain management procedure today.    Observe/ monitor for the following signs & symptoms:  If you notice Excessive bleeding (slow general oozing that completely soaks the dressing, or fresh bright red bleeding).   In either case, apply pressure to the area, elevate it if possible & call your doctor at once.    Also observe for:  Change in color  Numbness/tingling  Coldness to the touch  Swelling  Drainage  Temperature of 101.5 or higher.  Increased, uncontrollable pain.    *If you notice the above signs & symptoms, please call your doctor right away!*      Discharge Instructions:    Your pain may not be gone immediately after this procedure; it generally takes 3 to 5 days for the steroid to work.   Keep the needle site clean & dry for 24 hours.  Continue your present medications.  Make an appointment to see your doctor in 2-3 weeks.  If any problems occur, or if you have any further questions, please call as soon as possible. If you find that you cannot reach your doctor, but feel that the condition nees a doctor's attention, go to the closest emergency department & take this discharge paper with you.       Dr. Leo's Office: [phone]

## 2025-02-12 ENCOUNTER — APPOINTMENT (OUTPATIENT)
Dept: RADIOLOGY | Facility: HOSPITAL | Age: 70
End: 2025-02-12
Payer: MEDICARE

## 2025-02-27 ENCOUNTER — TELEPHONE (OUTPATIENT)
Dept: PRIMARY CARE | Facility: CLINIC | Age: 70
End: 2025-02-27
Payer: MEDICARE

## 2025-02-27 DIAGNOSIS — M25.50 POLYARTHRALGIA: ICD-10-CM

## 2025-02-27 DIAGNOSIS — R51.9 FACIAL PAIN: ICD-10-CM

## 2025-02-27 DIAGNOSIS — G89.3 CANCER-RELATED PAIN: ICD-10-CM

## 2025-02-27 DIAGNOSIS — J44.9 CHRONIC OBSTRUCTIVE PULMONARY DISEASE, UNSPECIFIED COPD TYPE (MULTI): ICD-10-CM

## 2025-02-27 NOTE — TELEPHONE ENCOUNTER
Pt is requesting refill of   morphine 20 mg/mL - 10 mg po QID  [facility]                                                        LV:   1/8/25                NV:  3/19/25                OARRS reviewed with LFD:  1/31/25 #60/30 days                          Pended RX to [facility] for transmission to pharmacy.

## 2025-02-28 RX ORDER — MORPHINE SULFATE 20 MG/ML
10 SOLUTION ORAL 4 TIMES DAILY PRN
Qty: 60 ML | Refills: 0 | Status: SHIPPED | OUTPATIENT
Start: 2025-03-02 | End: 2025-04-01

## 2025-02-28 NOTE — TELEPHONE ENCOUNTER
Okay to continue morphine 20 mg/mL with patient taking 10 mg up to 4 times per day as needed for pain number 60 mL for 30 days.

## 2025-03-05 ENCOUNTER — APPOINTMENT (OUTPATIENT)
Dept: PAIN MEDICINE | Facility: HOSPITAL | Age: 70
End: 2025-03-05
Payer: MEDICARE

## 2025-03-19 ENCOUNTER — OFFICE VISIT (OUTPATIENT)
Dept: PAIN MEDICINE | Facility: HOSPITAL | Age: 70
End: 2025-03-19
Payer: MEDICARE

## 2025-03-19 VITALS
SYSTOLIC BLOOD PRESSURE: 118 MMHG | RESPIRATION RATE: 16 BRPM | BODY MASS INDEX: 19.54 KG/M2 | OXYGEN SATURATION: 98 % | HEART RATE: 75 BPM | HEIGHT: 65 IN | DIASTOLIC BLOOD PRESSURE: 78 MMHG | WEIGHT: 117.3 LBS

## 2025-03-19 DIAGNOSIS — R51.9 FACIAL PAIN: ICD-10-CM

## 2025-03-19 DIAGNOSIS — G89.3 CANCER-RELATED PAIN: ICD-10-CM

## 2025-03-19 DIAGNOSIS — M25.50 POLYARTHRALGIA: ICD-10-CM

## 2025-03-19 PROCEDURE — 3008F BODY MASS INDEX DOCD: CPT | Performed by: CLINICAL NURSE SPECIALIST

## 2025-03-19 PROCEDURE — 1159F MED LIST DOCD IN RCRD: CPT | Performed by: CLINICAL NURSE SPECIALIST

## 2025-03-19 PROCEDURE — G2211 COMPLEX E/M VISIT ADD ON: HCPCS | Performed by: CLINICAL NURSE SPECIALIST

## 2025-03-19 PROCEDURE — 1160F RVW MEDS BY RX/DR IN RCRD: CPT | Performed by: CLINICAL NURSE SPECIALIST

## 2025-03-19 PROCEDURE — 99214 OFFICE O/P EST MOD 30 MIN: CPT | Performed by: CLINICAL NURSE SPECIALIST

## 2025-03-19 PROCEDURE — 1036F TOBACCO NON-USER: CPT | Performed by: CLINICAL NURSE SPECIALIST

## 2025-03-19 RX ORDER — DULOXETIN HYDROCHLORIDE 60 MG/1
CAPSULE, DELAYED RELEASE ORAL
Qty: 30 CAPSULE | Refills: 5 | Status: SHIPPED | OUTPATIENT
Start: 2025-03-19

## 2025-03-19 ASSESSMENT — ENCOUNTER SYMPTOMS
DEPRESSION: 0
OCCASIONAL FEELINGS OF UNSTEADINESS: 0
LOSS OF SENSATION IN FEET: 0

## 2025-03-19 NOTE — PROGRESS NOTES
Subjective   Patient ID: Nayely Barrera is a 69 y.o. female who presents for chronic oropharyngeal pain related to cancer, right shoulder pain hip pain and low back pain.  HPI    69 year old female with history of oropharyngeal cancer s/p resection and radiation treatment resulting in chronic mouth/throat pain/dysphagia as well as chronic low back and polyarticular pain presents for follow-up.  Polyarticular pain remains most bothersome to right shoulder and right hip.  Patient had a previous fall resulting in a right-sided rib fracture as well as pelvic fracture.  Diagnosed as osteoporosis and has been maintained on Reclast.  We have maintain the patient on immediate release liquid morphine due to difficulty swallowing status post radiation treatment.  Sparingly supplements with Marinol for anorexia.  Continued on duloxetine.  Patient was experiencing increasing right hip pain at her last office visit and was scheduled for a right intra-articular hip injection.  Presenting at today's office visit for routine follow-up and medication check.  Continues to experience chronic mouth/throat pain with difficulty swallowing.  Oral pain intensified by eating.  Continues to tolerate a softer diet secondary to difficulty swallowing.  Continues to experience dry mouth which she treats with a mouth moisturizer.  Her oral pain is accompanied by tongue burning.  Continues to work on gaining weight.  Polyarticular pain most bothersome to her right shoulder and right hip.  Low back pain may radiate to her right lower extremity intermittently to the level of her knee.  She has chronic numbness and tingling in her right lower extremity.  Denies any increasing weakness, changes in bowel/bladder function or balance issues.  Denies any recent falls.  Patient states she received some relief with her recent hip injection.  She feels that her hip pain is not as intense as it previously had been prior to her injection.  Continues to  "describe her pain as aching and throbbing.  Pain increases with prolonged standing and walking.  Continues to do well with MS IR 10 mg up to 4 times per day as needed.  She tries to take this medication only 3 times per day and use the fourth dose of pain is more intense.  Continued relief with duloxetine.  She tries to do her home therapy stretches and exercises and has incorporated some light weight training.  Recently started on new medication for osteoporosis.  She is also scheduled for a barium swallow to evaluate her dysphagia.    Location of Pain: Patient returns to the office for interval re-evaluation of \"right shoulder pain that radiates down right arm to elbow and up neck, low right back pain/right hip radiates to the knee, mouth pain from cancer, pelvic pain due to fx around February\" stated pain complaints. Patient presents to office today post right intra-articular hip injection and reports       Pain Score: 4/10     Treatment: Morphine Sulfate Oral Solution IR 0.5 ml (10mg) up to 4 times a day   Medication Count:8 ml left in rx bottle + full unopened bottle  Fill Date: 01/31/2025 (opened) & 03/02/2025 (unopened)  Last Dose: 03/19/2025  Efficacy: 50% for 4hrs  Side Effects: Denies     Narcan: Pt brought unopened Narcan to today's visit. EXP. 2/2026     Other treatment plans-medications/neuromodulators: Duloxetine 60mg- needs refilled     Injections and/or Procedures: none     Pregnant: n/a     Pt sts, \"I'm satisfied with my current plan of care\"                OARRS:  Kamini Sawant, APRN-CNP, APRN-CNS on 3/19/2025  1:33 PM  I have personally reviewed the OARRS report for Nayely WOOD Holly. I have considered the risks of abuse, dependence, addiction and diversion    Is the patient prescribed a combination of a benzodiazepine and opioid?  No    Last Urine Drug Screen / ordered today: No  Recent Results (from the past 8760 hours)   Confirmation Opiate/Opioid/Benzo Prescription Compliance    Collection " Time: 01/08/25 12:25 PM   Result Value Ref Range    Clonazepam <25 <25 ng/mL    7-Aminoclonazepam <25 <25 ng/mL    Alprazolam <25 <25 ng/mL    Alpha-Hydroxyalprazolam <25 <25 ng/mL    Midazolam <25 <25 ng/mL    Alpha-Hydroxymidazolam <25 <25 ng/mL    Chlordiazepoxide <25 <25 ng/mL    Diazepam <25 <25 ng/mL    Nordiazepam <25 <25 ng/mL    Temazepam <25 <25 ng/mL    Oxazepam <25 <25 ng/mL    Lorazepam <25 <25 ng/mL    Methadone <25 <25 ng/mL    EDDP <25 <25 ng/mL    6-Acetylmorphine <25 <25 ng/mL    Codeine <50 <50 ng/mL    Hydrocodone <25 <25 ng/mL    Hydromorphone 69 (H) <25 ng/mL    Morphine  >2,500 (H) <50 ng/mL    Norhydrocodone <25 <25 ng/mL    Noroxycodone <25 <25 ng/mL    Oxycodone <25 <25 ng/mL    Oxymorphone <25 <25 ng/mL    Fentanyl <2.5 <2.5 ng/mL    Norfentanyl <2.5 <2.5 ng/mL    Tramadol <50 <50 ng/mL    O-Desmethyltramadol <50 <50 ng/mL    Zolpidem <25 <25 ng/mL    Zolpidem Metabolite (ZCA) <25 <25 ng/mL   Screen Opiate/Opioid/Benzo Prescription Compliance    Collection Time: 01/08/25 12:25 PM   Result Value Ref Range    Creatinine, Urine Random 22.4 20.0 - 320.0 mg/dL    Amphetamine Screen, Urine Presumptive Negative Presumptive Negative    Barbiturate Screen, Urine Presumptive Negative Presumptive Negative    Cannabinoid Screen, Urine Presumptive Negative Presumptive Negative    Cocaine Metabolite Screen, Urine Presumptive Negative Presumptive Negative    PCP Screen, Urine Presumptive Negative Presumptive Negative   Tapentadol Confirmation, Urine    Collection Time: 01/08/25 12:25 PM   Result Value Ref Range    Tapentadol <25 <25 ng/mL    N-Desmethyltapentadol <25 <25 ng/mL   Buprenorphine Confirm,Urine    Collection Time: 01/08/25 12:25 PM   Result Value Ref Range    BUPRENORPHINE GLUC, URINE <5 ng/mL    BUPRENORPHINE ,URINE <2 ng/mL    NALOXONE, URINE <100 ng/mL    NORBUPRENORPHINE GLUC,URINE <5 ng/mL    NORBUPRENORPHINE, URINE <2 ng/mL     Results are as expected.     Controlled Substance  Agreement:  Date of the Last Agreement: 7/24/24  Reviewed Controlled Substance Agreement including but not limited to the benefits, risks, and alternatives to treatment with a Controlled Substance medication(s).        Review of Systems      ROS:   General: No fevers, chills, weight loss  Skin: Negative for lesions  Eyes: No acute vision changes  Ears: No vertigo  Nose, mouth, throat: No difficulty swallowing or speaking  Respiratory: No cough, shortness of breath, cyanosis  Cardiovascular: Negative for chest pain syncope or palpitation  Gastrointestinal: No constipation, nausea, vomiting  Neurological: Negative for headache, positive for: Paresthesia  Psychological: Negative for severe or debilitating anxiety, depression. Negative memory loss  Musculoskeletal: Positive for arthralgia, myalgia and pain  Endocrine: Negative for weight gain, appetite changes, excessive sweating  Allergy/immune: Negative    All 13 systems were reviewed and are within normal levels except as noted or in the history of present illness.  Positive or pertinent negative responses are noted or were in the history of present illness. As noted, the patient denies significant or impairing weakness in the bilateral upper and lower extremities, medication induced constipation, and bowel or bladder incontinence.     Current Outpatient Medications   Medication Instructions    albuterol 90 mcg/actuation inhaler 2 puffs, Every 4 hours PRN    calcium carbonate/vitamin D3 (CALCIUM 500 + D, D3, ORAL) Take by mouth.    droNABinol (Syndros) 5 mg/mL solution 0.5 mL EVERY 12 HOURS (route: oral)    DULoxetine (Cymbalta) 60 mg DR capsule TAKE ONE CAPSULE BY MOUTH DAILY. PATIENT MAY OPEN CAPSULE AND SPRINKLE IN FOOD.    ibuprofen 200 mg tablet 2 times daily    levothyroxine (SYNTHROID, LEVOXYL) 75 mcg, oral, Daily before breakfast, Take all by itself with water only and then wait at least 1 hour before having food or other medication.    morphine 10 mg, oral,  4 times daily PRN        No past medical history on file.     Past Surgical History:   Procedure Laterality Date    OTHER SURGICAL HISTORY  08/24/2022    Tubal ligation    OTHER SURGICAL HISTORY  08/24/2022    Cholecystectomy    OTHER SURGICAL HISTORY  08/24/2022    Hip surgery    OTHER SURGICAL HISTORY  08/24/2022    Tongue surgery    OTHER SURGICAL HISTORY  08/24/2022    Shoulder surgery        No family history on file.     No Known Allergies     No results found for this or any previous visit from the past 1000 days.      Objective     Vitals:    03/19/25 1319   BP: 118/78   Pulse: 75   Resp: 16   SpO2: 98%               Physical Exam    GENERAL EXAM  Vital Signs: Vital signs to include heart rate, respiration rate, blood pressure, and temperature were reviewed.  General Appearance:  Awake, alert, healthy appearing, well developed, No acute distress.  Head: Normocephalic without evidence of head injury.  Neck: The appearance of the neck was normal without swelling with a midline trachea.  Eyes: The eyelids and eyebrows exhibited no abnormalities.  Pupils were not pin-point.  Sclera was without icterus.  Lungs: Respiration rhythm and depth was normal.  Respiratory movements were normal without labored breathing.  Cardiovascular: No peripheral edema was present.    Spine, lumbar: Patient is able to rise from seated to standing position without hesitancy.  Gait is slow and slightly limping favoring right lower extremity.  Tenderness to paraspinous musculature mid to lower thoracic region throughout her lumbar spine.  Flexion and extension limited secondary to stiffness.  Ortho: Right shoulder: Pain with active/passive range of motion right shoulder.  Chronic elevation of right shoulder.  Limited internal and external rotation which increases pain.  Ortho: Right hip: Pain over lateral aspect of right hip/right bursa.  Pain with active/passive range of motion right hip.  Positive logroll on the right.  Neurological:  Patient was oriented to time, place, and person.  Speech was normal.  Balance, gait, and stance were unremarkable.    Psychiatric: Appearance was normal with appropriate dress.  Mood was euthymic and affect was normal.  Skin: Affected regions were without ecchymosis or skin lesions.            Assessment/Plan   Problem List Items Addressed This Visit             ICD-10-CM    Cancer-related pain G89.3     69 year-old female with history of oropharyngeal cancer resulting in chronic cancer related oral pain as well as dysphagia. Patient also has chronic low back pain and polyarticular pain.  Right shoulder pain and low back pain remains stable and unchanged from previous exam.  Patient was most bothered by right hip pain at her last office visit. Previous right femur fracture resulting in surgical repair.  Previous right hip injections with a different pain management clinic provided significant/sustained relief for multiple years.  Patient was scheduled to proceed with a right intra-articular hip injection.  Presenting at today's office visit for follow-up after her right hip injection.  Patient states she did receive some relief with this hip injection as her hip pain is less intense.  She continues to have difficulty standing and walking secondary to hip pain.  Less pain radiating from her hip to her groin.  Continues to experience pain over the lateral portion of her right hip/right bursa.  Continues to experience right shoulder pain with limited range of motion.  Chronic mouth pain most noted when she is eating.  Pain accompanied by dryness and burning.  Treating oral dryness with mouth moisturizer and utilizing Ensure for weight gain.  Utilizing Marinol sparingly as it causes increasing burning in her mouth.  Scheduled to follow-up with her ENT and proceed with a barium swallow to assess her worsening dysphagia.  She also is scheduled to start a new drug to treat her osteoporosis.  She has incorporated home  exercises and stretches as well as light weight training to help with osteoporosis.  She continues to manage her pain with MS IR oral solution 10 mg up to 4 times per day, duloxetine 60 mg daily and Marinol 0.5 mL every 12 hours for appetite stimulation.  She tries to limit her MS IR to 2-3 times per day on most days and uses the fourth dose only when pain is more intense.  At this time I feel it is reasonable to continue with the same regimen.  As far as her hip pain, if she continues to experience lateral hip pain which may be related to trochanteric bursitis, she may benefit from a right greater trochanteric bursa injection.  We will be careful with use of steroids secondary to significant osteoporosis. Plan reviewed with patient at today's visit.    -Patient may repeat her right intra-articular hip injection or try a right greater trochanteric bursa injection in the future if hip pain should worsen.    -Continue MS IR oral solution 10 mg every 6 hours as needed for pain.  We will continue this medication at this time as the patient feels it provides significant relief without adverse effects.  The patient tries to limit her use of this medication taking 2-3 times per day on most days and limit her dose to 5 to 7.5 mg when able.  -Continue duloxetine 60 mg daily.  Patient is able to open capsules and sprinkle in food. Currently tolerating this medication without adverse effects.  -Continue Marinol for appetite stimulation.  Patient will continue to work on weight gain.  -Patient will continue home  exercises consistently 5 to 6 days/week targeting increasing strength to lower extremities as well as incorporating light weight training  -Continue treatment for osteoporosis..  -Patient will follow-up in 6-8 weeks after her injection for reevaluation.      MEDICAL DECISION MAKING:    My impressions and treatment recommendations were discussed in detail with the patient, who verbalized understanding and had no further  questions prior to discharge. Given the patient's report of reduced pain and improved functional ability without adverse effects,  it is reasonable to continue MS IR oral solution 10 mg every 6 hours as needed.  The terms of the opioid agreement as well as the potential risks and adverse effects of the patient's medication regimen were discussed in detail. This includes if applicable due to dosage of medication permission to discuss and coordinate care with other treatment providers relevant to the patients condition. The patient verbalized understanding.    Treatment goals were discussed in detail with the patient.  These goals include reduction of pain levels, improved levels of functioning, avoidance of medication side effect and lowest medication dose possible to achieve  these goals.  The patient was in full agreement with these goals.  Also discussed is the understanding that pain may not be eliminated by medication but that the goal of a better sustaining life through use of medication is appropriate.  Lifestyle modifications including weight management, stretching, diet, exercise and smoking are addressed at each office visit.  The patient provided a urine drug screen for routine monitoring and compliance.  This test may be given as frequently as every month based on the patient's individual opiate risk stratification and prescriber concerns for any aberrancies.  This test is indicated given the use of controlled substances for the patient's medical condition.  Unless otherwise noted the prior (s) urine drug testing results were consistent with prescribed medications.  There is no evidence of illicit drug use or additional medications ingested.     Risks and side effects of chronic opioid therapy including but not limited to tolerance, dependence, constipation, hyperalgesia, cognitive side effects, addiction and possible death due to overuse and or misuse were discussed. I also discussed that such  medications when co-administered with other sedative agents including but not limited to alcohol, benzodiazepines, sedative hypnotics and illegal drugs could pose life threatening consequences including death.  I also explained the impact that the administration of such medication has on a patient with obstructive sleep apnea and continued recommendations for use of apnea devices if ordered are prescribed by other physicians.  In order to effectively and safely treat the pain, I also emphasized the importance of compliance with the treatment plan as well as compliance with the terms of the opioid agreement which was reviewed in detail. I explained the importance of being responsible with the medications and to take these only as prescribed, never in excess and never for reasons other than pain reduction. The patient was counseled on keeping the medications safe and locked away from children and other adults as well as disposal methods and options. The patient understood the risks and instructions.      I also discussed with the patient in detail that based on the clinical response to the opioid medications and improvements of activities of daily living, sleep, and work performance in light of compliance with the treatment plan we can continue this form of therapy for the above chronic  pain.  The goal and rationale used for current treatment with chronic opioid medication is to control the pain and alleviate disability induced by the chronic pain condition noted above after failures of other non-opioid and nonpharmacological modalities to treat the chronic pain and the symptoms associated with have failed.  The patient understood the goals in terms of the above treatment plan and had no further questions prior to leaving the office today.    Given the patient's total MED, general use of daily opiates, or other coadministered medications in various classes the patient was offered a prescription for Narcan.  I  instructed the patient that Narcan is for emergency use only and is worse suspected or known opiate overdose.  Call 911 prior to administration of this medication to activate the emergency response team.  It is imperative to fill this medication in order to demonstrate understanding of the gravity of possible side effects including respiratory depression and risk of overdose of this opiate load or medication combination.  As such patients will be required to bring Narcan prescriptions to follow-up appointments as part of the compliance with continued opiate care.                 Relevant Medications    DULoxetine (Cymbalta) 60 mg DR capsule    Facial pain R51.9    Relevant Medications    DULoxetine (Cymbalta) 60 mg DR capsule    Polyarthralgia M25.50    Relevant Medications    DULoxetine (Cymbalta) 60 mg DR capsule              This note was generated with the aid of dictation software, there may be typos despite my attempts at proofreading.

## 2025-03-19 NOTE — ASSESSMENT & PLAN NOTE
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 Patient also has chronic low back pain and polyarticular pain.  Right shoulder pain and low back pain remains stable and unchanged from previous exam.  Patient was most bothered by right hip pain at her last office visit. Previous right femur fracture resulting in surgical repair.  Previous right hip injections with a different pain management clinic provided significant/sustained relief for multiple years.  Patient was scheduled to proceed with a right intra-articular hip injection.  Presenting at today's office visit for follow-up after her right hip injection.  Patient states she did receive some relief with this hip injection as her hip pain is less intense.  She continues to have difficulty standing and walking secondary to hip pain.  Less pain radiating from her hip to her groin.  Continues to experience pain over the lateral portion of her right hip/right bursa.  Continues to experience right shoulder pain with limited range of motion.  Chronic mouth pain most noted when she is eating.  Pain accompanied by dryness and burning.  Treating oral dryness with mouth moisturizer and utilizing Ensure for weight gain.  Utilizing Marinol sparingly as it causes increasing burning in her mouth.  Scheduled to follow-up with her ENT and proceed with a barium swallow to assess her worsening dysphagia.  She also is scheduled to start a new drug to treat her osteoporosis.  She has incorporated home exercises and stretches as well as light weight training to help with osteoporosis.  She continues to manage her pain with MS IR oral solution 10 mg up to 4 times per day, duloxetine 60 mg daily and Marinol 0.5 mL every 12 hours for appetite stimulation.  She tries to limit her MS IR to 2-3 times per day on most days and uses the fourth dose only when pain is more intense.  At this time I feel it is reasonable  to continue with the same regimen.  As far as her hip pain, if she continues to experience lateral hip pain which may be related to trochanteric bursitis, she may benefit from a right greater trochanteric bursa injection.  We will be careful with use of steroids secondary to significant osteoporosis. Plan reviewed with patient at today's visit.    -Patient may repeat her right intra-articular hip injection or try a right greater trochanteric bursa injection in the future if hip pain should worsen.    -Continue MS IR oral solution 10 mg every 6 hours as needed for pain.  We will continue this medication at this time as the patient feels it provides significant relief without adverse effects.  The patient tries to limit her use of this medication taking 2-3 times per day on most days and limit her dose to 5 to 7.5 mg when able.  -Continue duloxetine 60 mg daily.  Patient is able to open capsules and sprinkle in food. Currently tolerating this medication without adverse effects.  -Continue Marinol for appetite stimulation.  Patient will continue to work on weight gain.  -Patient will continue home  exercises consistently 5 to 6 days/week targeting increasing strength to lower extremities as well as incorporating light weight training  -Continue treatment for osteoporosis..  -Patient will follow-up in 6-8 weeks after her injection for reevaluation.      MEDICAL DECISION MAKING:    My impressions and treatment recommendations were discussed in detail with the patient, who verbalized understanding and had no further questions prior to discharge. Given the patient's report of reduced pain and improved functional ability without adverse effects,  it is reasonable to continue MS IR oral solution 10 mg every 6 hours as needed.  The terms of the opioid agreement as well as the potential risks and adverse effects of the patient's medication regimen were discussed in detail. This includes if applicable due to dosage of  medication permission to discuss and coordinate care with other treatment providers relevant to the patients condition. The patient verbalized understanding.    Treatment goals were discussed in detail with the patient.  These goals include reduction of pain levels, improved levels of functioning, avoidance of medication side effect and lowest medication dose possible to achieve  these goals.  The patient was in full agreement with these goals.  Also discussed is the understanding that pain may not be eliminated by medication but that the goal of a better sustaining life through use of medication is appropriate.  Lifestyle modifications including weight management, stretching, diet, exercise and smoking are addressed at each office visit.  The patient provided a urine drug screen for routine monitoring and compliance.  This test may be given as frequently as every month based on the patient's individual opiate risk stratification and prescriber concerns for any aberrancies.  This test is indicated given the use of controlled substances for the patient's medical condition.  Unless otherwise noted the prior (s) urine drug testing results were consistent with prescribed medications.  There is no evidence of illicit drug use or additional medications ingested.     Risks and side effects of chronic opioid therapy including but not limited to tolerance, dependence, constipation, hyperalgesia, cognitive side effects, addiction and possible death due to overuse and or misuse were discussed. I also discussed that such medications when co-administered with other sedative agents including but not limited to alcohol, benzodiazepines, sedative hypnotics and illegal drugs could pose life threatening consequences including death.  I also explained the impact that the administration of such medication has on a patient with obstructive sleep apnea and continued recommendations for use of apnea devices if ordered are prescribed by  other physicians.  In order to effectively and safely treat the pain, I also emphasized the importance of compliance with the treatment plan as well as compliance with the terms of the opioid agreement which was reviewed in detail. I explained the importance of being responsible with the medications and to take these only as prescribed, never in excess and never for reasons other than pain reduction. The patient was counseled on keeping the medications safe and locked away from children and other adults as well as disposal methods and options. The patient understood the risks and instructions.      I also discussed with the patient in detail that based on the clinical response to the opioid medications and improvements of activities of daily living, sleep, and work performance in light of compliance with the treatment plan we can continue this form of therapy for the above chronic  pain.  The goal and rationale used for current treatment with chronic opioid medication is to control the pain and alleviate disability induced by the chronic pain condition noted above after failures of other non-opioid and nonpharmacological modalities to treat the chronic pain and the symptoms associated with have failed.  The patient understood the goals in terms of the above treatment plan and had no further questions prior to leaving the office today.    Given the patient's total MED, general use of daily opiates, or other coadministered medications in various classes the patient was offered a prescription for Narcan.  I instructed the patient that Narcan is for emergency use only and is worse suspected or known opiate overdose.  Call 911 prior to administration of this medication to activate the emergency response team.  It is imperative to fill this medication in order to demonstrate understanding of the gravity of possible side effects including respiratory depression and risk of overdose of this opiate load or medication  combination.  As such patients will be required to bring Narcan prescriptions to follow-up appointments as part of the compliance with continued opiate care.

## 2025-03-31 DIAGNOSIS — G89.3 CANCER-RELATED PAIN: ICD-10-CM

## 2025-03-31 DIAGNOSIS — R51.9 FACIAL PAIN: ICD-10-CM

## 2025-03-31 DIAGNOSIS — M25.50 POLYARTHRALGIA: ICD-10-CM

## 2025-03-31 NOTE — TELEPHONE ENCOUNTER
Patient called in to request refill of morphine sulfate 10mg QID and duloxetine 60mg daily from [facility]. Per last note, dosages to remain the same.    LV: 03/19/2025  NV: 06/11/2025  OARRS Last Fill Date: 03/02/2025 for MORPHINE       Pended to Kamini Sawant, BASSAM-CNSAPRN-CNP

## 2025-04-02 RX ORDER — MORPHINE SULFATE 20 MG/ML
10 SOLUTION ORAL 4 TIMES DAILY PRN
Qty: 60 ML | Refills: 0 | Status: SHIPPED | OUTPATIENT
Start: 2025-04-02 | End: 2025-05-02

## 2025-04-02 RX ORDER — DULOXETIN HYDROCHLORIDE 60 MG/1
CAPSULE, DELAYED RELEASE ORAL
Qty: 30 CAPSULE | Refills: 5 | Status: SHIPPED | OUTPATIENT
Start: 2025-04-02

## 2025-04-02 NOTE — TELEPHONE ENCOUNTER
Okay to refill oral morphine solution 10 mg up to 4 times per day as needed for pain and duloxetine 60 mg daily.

## 2025-04-06 DIAGNOSIS — E03.9 ACQUIRED HYPOTHYROIDISM: ICD-10-CM

## 2025-04-06 RX ORDER — LEVOTHYROXINE SODIUM 75 UG/1
TABLET ORAL
Qty: 90 TABLET | Refills: 0 | Status: SHIPPED | OUTPATIENT
Start: 2025-04-06

## 2025-05-05 DIAGNOSIS — M25.50 POLYARTHRALGIA: ICD-10-CM

## 2025-05-05 DIAGNOSIS — G89.3 CANCER-RELATED PAIN: ICD-10-CM

## 2025-05-05 DIAGNOSIS — R51.9 FACIAL PAIN: ICD-10-CM

## 2025-05-05 NOTE — TELEPHONE ENCOUNTER
Pt is requesting refill of   morphine 20 mg/mL - Take 0.5 mL (10 mg) by mouth 4 times a day as needed for severe pain ELIZ Fiore                                                      LV:   3/19/25                 NV:  6/11/25                OARRS reviewed with L[facility]:   4/2/25 #60/30 days                         Pended RX to TED Monson for transmission to pharmacy.

## 2025-05-06 RX ORDER — MORPHINE SULFATE 20 MG/ML
10 SOLUTION ORAL 4 TIMES DAILY PRN
Qty: 60 ML | Refills: 0 | Status: SHIPPED | OUTPATIENT
Start: 2025-05-06 | End: 2025-06-05

## 2025-05-06 NOTE — TELEPHONE ENCOUNTER
Okay to refill oral morphine 20 mg/mL with patient taking 0.5 mL or 10 mg every 6 hours as needed for pain.

## 2025-05-28 ENCOUNTER — TELEPHONE (OUTPATIENT)
Dept: PULMONOLOGY | Facility: HOSPITAL | Age: 70
End: 2025-05-28
Payer: MEDICARE

## 2025-05-28 NOTE — TELEPHONE ENCOUNTER
Called and got patients CT scheduled for 8/15/25 @ 11 a.m. at [facility]. She was originally scheduled to follow up in office with Dr. Page on 8/11/25 but we needed her to have her CT scan done before she came in. Patient is unable to come into the clinic on any day except for Wednesday and Fridays. Dr. Page does not have any openings on those days at this time. Scheduled patient to follow with Tiana on 8/22/25 for CT scan results. Gave all dates, times and locations to patient and answered all questions at this time. Instructed her to call us back with any further questions or concerns. Patient was agreeable to treatment plan and acknowledged understanding.     ----- Message from Dioni Page sent at 5/22/2025  4:27 PM EDT -----  Can you please advise patient to schedule LDCT chest that I ordered no later than August 14, 25. Please move her appt to be a week after the LDCT chest is completed. Thanks.  ----- Message -----  From: Michaela Reeder  Sent: 4/29/2025   4:38 PM EDT  To: Dioni Page MD

## 2025-06-06 ENCOUNTER — APPOINTMENT (OUTPATIENT)
Dept: PRIMARY CARE | Facility: CLINIC | Age: 70
End: 2025-06-06
Payer: MEDICARE

## 2025-06-11 ENCOUNTER — OFFICE VISIT (OUTPATIENT)
Dept: PAIN MEDICINE | Facility: HOSPITAL | Age: 70
End: 2025-06-11
Payer: MEDICARE

## 2025-06-11 VITALS
DIASTOLIC BLOOD PRESSURE: 81 MMHG | HEIGHT: 65 IN | BODY MASS INDEX: 18.16 KG/M2 | HEART RATE: 82 BPM | WEIGHT: 109 LBS | OXYGEN SATURATION: 97 % | SYSTOLIC BLOOD PRESSURE: 149 MMHG | RESPIRATION RATE: 16 BRPM

## 2025-06-11 DIAGNOSIS — Z79.891 LONG TERM PRESCRIPTION OPIATE USE: Primary | ICD-10-CM

## 2025-06-11 DIAGNOSIS — R51.9 FACIAL PAIN: ICD-10-CM

## 2025-06-11 DIAGNOSIS — M25.50 POLYARTHRALGIA: ICD-10-CM

## 2025-06-11 DIAGNOSIS — G89.3 CANCER-RELATED PAIN: ICD-10-CM

## 2025-06-11 PROCEDURE — 1036F TOBACCO NON-USER: CPT | Performed by: CLINICAL NURSE SPECIALIST

## 2025-06-11 PROCEDURE — 99214 OFFICE O/P EST MOD 30 MIN: CPT | Performed by: CLINICAL NURSE SPECIALIST

## 2025-06-11 PROCEDURE — 1160F RVW MEDS BY RX/DR IN RCRD: CPT | Performed by: CLINICAL NURSE SPECIALIST

## 2025-06-11 PROCEDURE — 1159F MED LIST DOCD IN RCRD: CPT | Performed by: CLINICAL NURSE SPECIALIST

## 2025-06-11 PROCEDURE — 3008F BODY MASS INDEX DOCD: CPT | Performed by: CLINICAL NURSE SPECIALIST

## 2025-06-11 PROCEDURE — G2211 COMPLEX E/M VISIT ADD ON: HCPCS | Performed by: CLINICAL NURSE SPECIALIST

## 2025-06-11 RX ORDER — MORPHINE SULFATE 20 MG/ML
10 SOLUTION ORAL 4 TIMES DAILY PRN
Qty: 60 ML | Refills: 0 | Status: SHIPPED | OUTPATIENT
Start: 2025-06-11 | End: 2025-07-11

## 2025-06-11 NOTE — ASSESSMENT & PLAN NOTE
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 Patient also has chronic low back pain and polyarticular pain.  Polyarticular pain most bothersome to her right shoulder and bilateral hips.  Patient proceeded with a right intra-articular hip injection which provided moderate relief.  Presenting at today's office visit with persistent oral pain and difficulty swallowing.  Pain increases with eating.  Continues to maintain a soft diet.  Continues to experience dry mouth.  She uses her Marinol sparingly for anorexia.  She has been able to maintain her weight.  Continues to experience polyarticular pain most bothersome to her right shoulder and bilateral hips left hip greater than right at today's visit.  Pain will radiate into her groin bilaterally.  She would like to hold off on repeating steroid injections due to concerns for significant osteoporosis.  Patient currently is on calcium at this time and awaiting a new osteoporosis medication to start in August.  Continues to do her home stretches and exercises as able. She continues to manage her pain with MSIR oral solution 10 mg up to 4 times per day, duloxetine 60 mg daily and Marinol 0.5 mL every 12 hours for appetite stimulation.  She tries to limit her MS IR to 2-3 times per day on most days and uses the fourth dose only when pain is more intense.  At this time I feel it is reasonable to continue with the same regimen.  Advised patient that if her hip pain increases we may consider repeating an intra-articular hip injection.  Plan reviewed with patient at today's visit.    -Patient may repeat an intra-articular hip injection in the future if hip pain should worsen.  We will be careful with steroid injection secondary to significant osteoporosis.    -Continue MSIR oral solution 10 mg every 6 hours as needed for pain.  OARRS reviewed and consistent.  We will continue this medication at this  time as the patient feels it provides significant relief without adverse effects.  The patient tries to limit her use of this medication taking 2-3 times per day on most days and limit her dose to 5 to 7.5 mg when able.  -Continue duloxetine 60 mg daily.  Patient is able to open capsules and sprinkle in food. Currently tolerating this medication without adverse effects.  -Continue Marinol for appetite stimulation.  Patient will continue to work on weight gain.  - Recommend she continue home  exercises consistently 5 to 6 days/week targeting increasing strength to lower extremities as well as incorporating light weight training  -Continue treatment for osteoporosis..  -Patient will follow-up in 3 months or sooner if needed.        MEDICAL DECISION MAKING:    My impressions and treatment recommendations were discussed in detail with the patient, who verbalized understanding and had no further questions prior to discharge. Given the patient's report of reduced pain and improved functional ability without adverse effects,  it is reasonable to continue MS IR oral solution 10 mg every 6 hours as needed.  The terms of the opioid agreement as well as the potential risks and adverse effects of the patient's medication regimen were discussed in detail. This includes if applicable due to dosage of medication permission to discuss and coordinate care with other treatment providers relevant to the patients condition. The patient verbalized understanding.    Treatment goals were discussed in detail with the patient.  These goals include reduction of pain levels, improved levels of functioning, avoidance of medication side effect and lowest medication dose possible to achieve  these goals.  The patient was in full agreement with these goals.  Also discussed is the understanding that pain may not be eliminated by medication but that the goal of a better sustaining life through use of medication is appropriate.  Lifestyle  modifications including weight management, stretching, diet, exercise and smoking are addressed at each office visit.  The patient provided a urine drug screen for routine monitoring and compliance.  This test may be given as frequently as every month based on the patient's individual opiate risk stratification and prescriber concerns for any aberrancies.  This test is indicated given the use of controlled substances for the patient's medical condition.  Unless otherwise noted the prior (s) urine drug testing results were consistent with prescribed medications.  There is no evidence of illicit drug use or additional medications ingested.     Risks and side effects of chronic opioid therapy including but not limited to tolerance, dependence, constipation, hyperalgesia, cognitive side effects, addiction and possible death due to overuse and or misuse were discussed. I also discussed that such medications when co-administered with other sedative agents including but not limited to alcohol, benzodiazepines, sedative hypnotics and illegal drugs could pose life threatening consequences including death.  I also explained the impact that the administration of such medication has on a patient with obstructive sleep apnea and continued recommendations for use of apnea devices if ordered are prescribed by other physicians.  In order to effectively and safely treat the pain, I also emphasized the importance of compliance with the treatment plan as well as compliance with the terms of the opioid agreement which was reviewed in detail. I explained the importance of being responsible with the medications and to take these only as prescribed, never in excess and never for reasons other than pain reduction. The patient was counseled on keeping the medications safe and locked away from children and other adults as well as disposal methods and options. The patient understood the risks and instructions.      I also discussed with the  patient in detail that based on the clinical response to the opioid medications and improvements of activities of daily living, sleep, and work performance in light of compliance with the treatment plan we can continue this form of therapy for the above chronic  pain.  The goal and rationale used for current treatment with chronic opioid medication is to control the pain and alleviate disability induced by the chronic pain condition noted above after failures of other non-opioid and nonpharmacological modalities to treat the chronic pain and the symptoms associated with have failed.  The patient understood the goals in terms of the above treatment plan and had no further questions prior to leaving the office today.    Given the patient's total MED, general use of daily opiates, or other coadministered medications in various classes the patient was offered a prescription for Narcan.  I instructed the patient that Narcan is for emergency use only and is worse suspected or known opiate overdose.  Call 911 prior to administration of this medication to activate the emergency response team.  It is imperative to fill this medication in order to demonstrate understanding of the gravity of possible side effects including respiratory depression and risk of overdose of this opiate load or medication combination.  As such patients will be required to bring Narcan prescriptions to follow-up appointments as part of the compliance with continued opiate care.

## 2025-06-11 NOTE — PROGRESS NOTES
Subjective   Patient ID: Nayely Barrera is a 69 y.o. female who presents for chronic oropharyngeal pain related to cancer, right shoulder pain, hip pain and low back pain.  HPI  69-year-old female with history of oropharyngeal cancer s/p resection and radiation treatment resulting in chronic mouth/throat pain/dysphagia as well as chronic low back and polyarticular pain presents for follow-up.  Polyarticular pain remains most bothersome to her shoulders and hips.  Previous fall resulting in a right-sided rib fracture as well as a pelvic fracture.  Diagnosed with osteoporosis and has been taking Reclast.  Waiting to restart a new medication for osteoporosis.  Patient manages her pain with immediate release liquid morphine due to difficulty swallowing status post radiation treatment.  She will supplement with Marinol for anorexia.  Continued on duloxetine.  Patient recently had a right hip corticosteroid injection which provided moderate relief.  Presenting at today's office visit for routine follow-up.  Continues to experience chronic mouth/throat pain with continued difficulty swallowing.  Polyarticular pain most bothersome to bilateral hips left greater than right.  Hip pain may radiate into her groin.  She also continues to experience shoulder pain.  Low back pain may radiating into her lower extremities right greater than left.  She has chronic numbness and tingling in the right lower extremity.  Intermittent weakness to bilateral lower extremities.  Denies any changes in bowel/bladder function or balance issues.  Denies any recent falls.  Pain is aching and throbbing.  Pain increases with prolonged standing, bending and walking.  She continues to do well with her medication regimen consisting of duloxetine and MSIR.  She states that she tries to use her morphine 2-3 times per day at most.  She has not been able to start her new medication for osteoporosis and is due to start this medication in August.   "Continues to follow closely with her surgeon for dysphagia.  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    Location of Pain: Patient returns to the office for interval re-evaluation of \"right shoulder pain that radiates down right arm to elbow and up neck, low right back pain/right hip radiates to the knee/foot, mouth pain from cancer, pelvic pain due to fx around February. Left hip pain.\" stated pain complaints.       Pain Score: 4/10     Treatment: Morphine Sulfate Oral Solution IR 0.5 ml (10mg) up to 4 times a day   Medication Count: 10 ml left in rx bottle  Fill Date: 5/8 or 5/10? Labeled ripped off so cannot see date  Last Dose: 6/11/25 1 dose and 2-3 doses per day usually  Efficacy: 50% for 4hrs  Side Effects: Denies     Narcan: Pt brought unopened Narcan to today's visit. EXP. 2/2026     Other treatment plans-medications/neuromodulators: Duloxetine 60mg     Injections and/or Procedures: 2/7/25 Right hip injection- 30% relief     Pregnant: n/a  OARRS:  Kamini Sawant, APRN-CNP, APRN-CNS on 6/11/2025  4:07 PM  I have personally reviewed the OARRS report for Nayely Barrera. I have considered the risks of abuse, dependence, addiction and diversion    Is the patient prescribed a combination of a benzodiazepine and opioid?  No    Last Urine Drug Screen / ordered today: Yes  Recent Results (from the past 8760 hours)   Confirmation Opiate/Opioid/Benzo Prescription Compliance    Collection Time: 01/08/25 12:25 PM   Result Value Ref Range    Clonazepam <25 <25 ng/mL    7-Aminoclonazepam <25 <25 ng/mL    Alprazolam <25 <25 ng/mL    Alpha-Hydroxyalprazolam <25 <25 ng/mL    Midazolam <25 <25 ng/mL    Alpha-Hydroxymidazolam <25 <25 ng/mL    Chlordiazepoxide <25 <25 ng/mL    Diazepam <25 <25 ng/mL    Nordiazepam <25 <25 ng/mL    Temazepam <25 <25 ng/mL    Oxazepam " <25 <25 ng/mL    Lorazepam <25 <25 ng/mL    Methadone <25 <25 ng/mL    EDDP <25 <25 ng/mL    6-Acetylmorphine <25 <25 ng/mL    Codeine <50 <50 ng/mL    Hydrocodone <25 <25 ng/mL    Hydromorphone 69 (H) <25 ng/mL    Morphine  >2,500 (H) <50 ng/mL    Norhydrocodone <25 <25 ng/mL    Noroxycodone <25 <25 ng/mL    Oxycodone <25 <25 ng/mL    Oxymorphone <25 <25 ng/mL    Fentanyl <2.5 <2.5 ng/mL    Norfentanyl <2.5 <2.5 ng/mL    Tramadol <50 <50 ng/mL    O-Desmethyltramadol <50 <50 ng/mL    Zolpidem <25 <25 ng/mL    Zolpidem Metabolite (ZCA) <25 <25 ng/mL   Screen Opiate/Opioid/Benzo Prescription Compliance    Collection Time: 01/08/25 12:25 PM   Result Value Ref Range    Creatinine, Urine Random 22.4 20.0 - 320.0 mg/dL    Amphetamine Screen, Urine Presumptive Negative Presumptive Negative    Barbiturate Screen, Urine Presumptive Negative Presumptive Negative    Cannabinoid Screen, Urine Presumptive Negative Presumptive Negative    Cocaine Metabolite Screen, Urine Presumptive Negative Presumptive Negative    PCP Screen, Urine Presumptive Negative Presumptive Negative   Tapentadol Confirmation, Urine    Collection Time: 01/08/25 12:25 PM   Result Value Ref Range    Tapentadol <25 <25 ng/mL    N-Desmethyltapentadol <25 <25 ng/mL   Buprenorphine Confirm,Urine    Collection Time: 01/08/25 12:25 PM   Result Value Ref Range    BUPRENORPHINE GLUC, URINE <5 ng/mL    BUPRENORPHINE ,URINE <2 ng/mL    NALOXONE, URINE <100 ng/mL    NORBUPRENORPHINE GLUC,URINE <5 ng/mL    NORBUPRENORPHINE, URINE <2 ng/mL     Results are as expected.     Controlled Substance Agreement:  Date of the Last Agreement: 6/11/25  Reviewed Controlled Substance Agreement including but not limited to the benefits, risks, and alternatives to treatment with a Controlled Substance medication(s).    Monitoring and compliance:    ORT:    PDUQ:    Office Agreement:      Review of Systems    ROS:   General: No fevers, chills, weight loss  Skin: Negative for  "lesions  Eyes: No acute vision changes  Ears: No vertigo  Nose, mouth, throat: No difficulty swallowing or speaking  Respiratory: No cough, shortness of breath, cyanosis  Cardiovascular: Negative for chest pain syncope or palpitation  Gastrointestinal: No constipation, nausea, vomiting  Neurological: Negative for headache, positive for: Paresthesia and weakness; neuralgia  Psychological: Negative for severe or debilitating anxiety, depression. Negative memory loss  Musculoskeletal: Positive for arthralgia, myalgia and pain  Endocrine: Negative for weight gain, appetite changes, excessive sweating  Allergy/immune: Negative    All 13 systems were reviewed and are within normal levels except as noted or in the history of present illness.  Positive or pertinent negative responses are noted or were in the history of present illness. As noted, the patient denies significant or impairing weakness in the bilateral upper and lower extremities, medication induced constipation, and bowel or bladder incontinence.   Current Medications[1]     Medical History[2]     Surgical History[3]     Family History[4]     RX Allergies[5]     Objective     Visit Vitals  /81   Pulse 82   Resp 16   Ht 1.651 m (5' 5\")   Wt 49.4 kg (109 lb)   SpO2 97%   BMI 18.14 kg/m²   OB Status Postmenopausal   Smoking Status Former   BSA 1.51 m²        Physical Exam    PE:  General: Well-developed, well-nourished, no acute distress. The patient demonstrates no pain behavior, symptom magnification or overt drug-seeking behavior.  Eye: Pupils appropriate for room lighting  Neck/thyroid: No obvious goiter or enlargement of neck noted  Respiratory exam: Normal respiratory effort, unlabored respiration. No accessory muscle use noted  Cardiac exam: Bilateral radial pulses intact  Abdominal: Nondistended  Spine, lumbar: The patient is able to rise from a seated to standing position without hesitancy, push off, or delay. Gait is slow and deliberate.  Posture " slightly forward leaning.  Tenderness to paraspinous musculature mid to lower thoracic region throughout her lumbar spine.  Flexion and extension remain limited secondary to stiffness.    Ortho: Hip: Pain with active/passive range of motion bilateral hips.  Positive Chelsi bilaterally.  Positive logroll bilaterally.  Neurologic exam: Muscle strength is antigravity in all 4 extremities.  Psychiatric exam: Judgment and insight normal, affect normal, speech is fluent, affect appropriate, demonstrating no signs of hypersomnolence, sedation, or confusion        Assessment/Plan            [1]   Current Outpatient Medications:     calcium carbonate/vitamin D3 (CALCIUM 500 + D, D3, ORAL), Take by mouth., Disp: , Rfl:     droNABinol (Syndros) 5 mg/mL solution, 0.5 mL EVERY 12 HOURS (route: oral), Disp: 30 mL, Rfl: 0    DULoxetine (Cymbalta) 60 mg DR capsule, TAKE ONE CAPSULE BY MOUTH DAILY. PATIENT MAY OPEN CAPSULE AND SPRINKLE IN FOOD., Disp: 30 capsule, Rfl: 5    ibuprofen 200 mg tablet, Take by mouth 2 times a day., Disp: , Rfl:     levothyroxine (Synthroid, Levoxyl) 75 mcg tablet, TAKE ONE TABLET BY MOUTH EVERY DAY IN THE MORNING, take before meals. take all by itself with water only and then wait at least 1 hour before having food or other medication, Disp: 90 tablet, Rfl: 0    albuterol 90 mcg/actuation inhaler, Inhale 2 puffs every 4 hours if needed. (Patient not taking: Reported on 2/7/2025), Disp: , Rfl:     morphine 20 mg/mL concentrated oral solution, Take 0.5 mL (10 mg) by mouth 4 times a day as needed for severe pain (7 - 10)., Disp: 60 mL, Rfl: 0  [2] No past medical history on file.  [3]   Past Surgical History:  Procedure Laterality Date    OTHER SURGICAL HISTORY  08/24/2022    Tubal ligation    OTHER SURGICAL HISTORY  08/24/2022    Cholecystectomy    OTHER SURGICAL HISTORY  08/24/2022    Hip surgery    OTHER SURGICAL HISTORY  08/24/2022    Tongue surgery    OTHER SURGICAL HISTORY  08/24/2022    Shoulder  surgery   [4] No family history on file.  [5] No Known Allergies

## 2025-06-15 LAB
1OH-MIDAZOLAM UR-MCNC: NEGATIVE NG/ML
7AMINOCLONAZEPAM UR-MCNC: NEGATIVE NG/ML
A-OH ALPRAZ UR-MCNC: NEGATIVE NG/ML
A-OH-TRIAZOLAM UR-MCNC: NEGATIVE NG/ML
AMPHETAMINES UR QL: NEGATIVE NG/ML
BARBITURATES UR QL: NEGATIVE NG/ML
BUPRENORPHINE UR-MCNC: NEGATIVE NG/ML
BZE UR QL: NEGATIVE NG/ML
CODEINE UR-MCNC: NEGATIVE NG/ML
CREAT UR-MCNC: 40.8 MG/DL
DRUG SCREEN COMMENT UR-IMP: ABNORMAL
DRUG SCREEN COMMENT UR-IMP: NORMAL
DRUG SCREEN COMMENT UR-IMP: NORMAL
EDDP UR-MCNC: NEGATIVE NG/ML
FENTANYL UR-MCNC: NEGATIVE NG/ML
HYDROCODONE UR-MCNC: NEGATIVE NG/ML
HYDROMORPHONE UR-MCNC: 107 NG/ML
LORAZEPAM UR-MCNC: NEGATIVE NG/ML
METHADONE UR-MCNC: NEGATIVE NG/ML
MORPHINE UR-MCNC: ABNORMAL NG/ML
NORBUPRENORPHINE UR-MCNC: NEGATIVE NG/ML
NORDIAZEPAM UR-MCNC: NEGATIVE NG/ML
NORFENTANYL UR-MCNC: NEGATIVE NG/ML
NORHYDROCODONE UR CFM-MCNC: NEGATIVE NG/ML
NOROXYCODONE UR CFM-MCNC: NEGATIVE NG/ML
NORTAPENTADOL UR-MCNC: NEGATIVE NG/ML
NORTRAMADOL UR-MCNC: NEGATIVE NG/ML
OH-ETHYLFLURAZ UR-MCNC: NEGATIVE NG/ML
OXAZEPAM UR-MCNC: NEGATIVE NG/ML
OXIDANTS UR QL: NEGATIVE MCG/ML
OXYCODONE UR CFM-MCNC: NEGATIVE NG/ML
OXYMORPHONE UR CFM-MCNC: NEGATIVE NG/ML
PCP UR QL: NEGATIVE NG/ML
PH UR: 6.6 [PH] (ref 4.5–9)
QUEST 6 ACETYLMORPHINE: NEGATIVE NG/ML
QUEST NALOXONE, URINE: NEGATIVE NG/ML
QUEST NOTES AND COMMENTS: NORMAL
QUEST ZOLPIDEM: NEGATIVE NG/ML
TAPENTADOL UR-MCNC: NEGATIVE NG/ML
TEMAZEPAM UR-MCNC: NEGATIVE NG/ML
THC UR QL: NEGATIVE NG/ML
TRAMADOL UR-MCNC: NEGATIVE NG/ML
ZOLPIDEM PHENYL-4-CARB UR CFM-MCNC: NEGATIVE NG/ML

## 2025-07-07 DIAGNOSIS — G89.3 CANCER-RELATED PAIN: ICD-10-CM

## 2025-07-07 DIAGNOSIS — R51.9 FACIAL PAIN: ICD-10-CM

## 2025-07-07 DIAGNOSIS — M25.50 POLYARTHRALGIA: ICD-10-CM

## 2025-07-07 NOTE — TELEPHONE ENCOUNTER
Pt is requesting refill of   morphine 20 mg/mL 10 mg. QID [facility]                                                        LV:   6/11/25                 NV:   9/3/25               OARRS reviewed with L[facility]:   6/11/25 #60/30 days                         Pended RX to [facility] for transmission to pharmacy.

## 2025-07-08 ENCOUNTER — PATIENT OUTREACH (OUTPATIENT)
Dept: CARE COORDINATION | Facility: CLINIC | Age: 70
End: 2025-07-08
Payer: MEDICARE

## 2025-07-08 DIAGNOSIS — Z12.31 ENCOUNTER FOR SCREENING MAMMOGRAM FOR BREAST CANCER: ICD-10-CM

## 2025-07-08 RX ORDER — MORPHINE SULFATE 20 MG/ML
10 SOLUTION ORAL 4 TIMES DAILY PRN
Qty: 60 ML | Refills: 0 | Status: SHIPPED | OUTPATIENT
Start: 2025-07-11 | End: 2025-08-10

## 2025-07-08 NOTE — TELEPHONE ENCOUNTER
Okay to refill MS IR liquid oral solution 20 mg per 10 mL with patient taking 10 mg every 6 hours as needed.

## 2025-07-12 DIAGNOSIS — E03.9 ACQUIRED HYPOTHYROIDISM: ICD-10-CM

## 2025-07-13 RX ORDER — LEVOTHYROXINE SODIUM 75 UG/1
TABLET ORAL
Qty: 90 TABLET | Refills: 0 | Status: SHIPPED | OUTPATIENT
Start: 2025-07-13

## 2025-07-30 ENCOUNTER — APPOINTMENT (OUTPATIENT)
Dept: OTOLARYNGOLOGY | Facility: CLINIC | Age: 70
End: 2025-07-30
Payer: MEDICARE

## 2025-08-04 DIAGNOSIS — G89.3 CANCER-RELATED PAIN: ICD-10-CM

## 2025-08-04 DIAGNOSIS — R51.9 FACIAL PAIN: ICD-10-CM

## 2025-08-04 DIAGNOSIS — M25.50 POLYARTHRALGIA: ICD-10-CM

## 2025-08-04 NOTE — TELEPHONE ENCOUNTER
Pt is requesting refill of   Liquid Morphine 10 mg po TID  [place]                                                        LV:  6/11/25                  NV: 9/3/25                 OARRS reviewed with LFD:   7/11/25 #60/30 days                         Pended RX to [facility] for transmission to pharmacy.

## 2025-08-05 RX ORDER — MORPHINE SULFATE 20 MG/ML
10 SOLUTION ORAL 4 TIMES DAILY PRN
Qty: 60 ML | Refills: 0 | Status: SHIPPED | OUTPATIENT
Start: 2025-08-10 | End: 2025-09-09

## 2025-08-05 NOTE — TELEPHONE ENCOUNTER
Will refill oral morphine solution 10 mg up to 4 times per day as needed for pain start date 8/10/2025.

## 2025-08-11 ENCOUNTER — APPOINTMENT (OUTPATIENT)
Dept: PULMONOLOGY | Facility: HOSPITAL | Age: 70
End: 2025-08-11
Payer: MEDICARE

## 2025-08-15 ENCOUNTER — HOSPITAL ENCOUNTER (OUTPATIENT)
Dept: RADIOLOGY | Facility: CLINIC | Age: 70
End: 2025-08-15
Payer: MEDICARE

## 2025-08-15 ENCOUNTER — HOSPITAL ENCOUNTER (OUTPATIENT)
Dept: RADIOLOGY | Facility: HOSPITAL | Age: 70
Discharge: HOME | End: 2025-08-15
Payer: MEDICARE

## 2025-08-15 DIAGNOSIS — Z87.891 FORMER CIGARETTE SMOKER: ICD-10-CM

## 2025-08-15 PROCEDURE — 71271 CT THORAX LUNG CANCER SCR C-: CPT

## 2025-08-19 ENCOUNTER — APPOINTMENT (OUTPATIENT)
Dept: PULMONOLOGY | Facility: HOSPITAL | Age: 70
End: 2025-08-19
Payer: MEDICARE

## 2025-08-22 ENCOUNTER — OFFICE VISIT (OUTPATIENT)
Dept: PULMONOLOGY | Facility: HOSPITAL | Age: 70
End: 2025-08-22
Payer: MEDICARE

## 2025-08-22 VITALS
HEIGHT: 64 IN | RESPIRATION RATE: 16 BRPM | TEMPERATURE: 97.4 F | WEIGHT: 110.2 LBS | DIASTOLIC BLOOD PRESSURE: 68 MMHG | HEART RATE: 77 BPM | SYSTOLIC BLOOD PRESSURE: 119 MMHG | BODY MASS INDEX: 18.82 KG/M2 | OXYGEN SATURATION: 98 %

## 2025-08-22 DIAGNOSIS — Z87.891 FORMER CIGARETTE SMOKER: ICD-10-CM

## 2025-08-22 DIAGNOSIS — J44.9 CHRONIC OBSTRUCTIVE PULMONARY DISEASE, UNSPECIFIED COPD TYPE (MULTI): Primary | ICD-10-CM

## 2025-08-22 PROCEDURE — 3008F BODY MASS INDEX DOCD: CPT | Performed by: NURSE PRACTITIONER

## 2025-08-22 PROCEDURE — 99213 OFFICE O/P EST LOW 20 MIN: CPT | Performed by: NURSE PRACTITIONER

## 2025-08-22 PROCEDURE — 1159F MED LIST DOCD IN RCRD: CPT | Performed by: NURSE PRACTITIONER

## 2025-08-22 PROCEDURE — 99212 OFFICE O/P EST SF 10 MIN: CPT

## 2025-08-22 ASSESSMENT — ENCOUNTER SYMPTOMS
OCCASIONAL FEELINGS OF UNSTEADINESS: 0
SHORTNESS OF BREATH: 1
LOSS OF SENSATION IN FEET: 0
DEPRESSION: 0

## 2025-08-22 ASSESSMENT — PATIENT HEALTH QUESTIONNAIRE - PHQ9
2. FEELING DOWN, DEPRESSED OR HOPELESS: NOT AT ALL
SUM OF ALL RESPONSES TO PHQ9 QUESTIONS 1 AND 2: 0
1. LITTLE INTEREST OR PLEASURE IN DOING THINGS: NOT AT ALL

## 2025-09-03 ENCOUNTER — APPOINTMENT (OUTPATIENT)
Dept: OTOLARYNGOLOGY | Facility: CLINIC | Age: 70
End: 2025-09-03
Payer: MEDICARE

## 2025-09-03 ENCOUNTER — OFFICE VISIT (OUTPATIENT)
Dept: PAIN MEDICINE | Facility: HOSPITAL | Age: 70
End: 2025-09-03
Payer: MEDICARE

## 2025-09-03 VITALS
SYSTOLIC BLOOD PRESSURE: 131 MMHG | DIASTOLIC BLOOD PRESSURE: 70 MMHG | HEART RATE: 77 BPM | HEIGHT: 64 IN | BODY MASS INDEX: 18.64 KG/M2 | WEIGHT: 109.2 LBS | OXYGEN SATURATION: 97 % | RESPIRATION RATE: 16 BRPM

## 2025-09-03 DIAGNOSIS — G89.3 CANCER-RELATED PAIN: ICD-10-CM

## 2025-09-03 DIAGNOSIS — R51.9 FACIAL PAIN: ICD-10-CM

## 2025-09-03 DIAGNOSIS — M25.50 POLYARTHRALGIA: ICD-10-CM

## 2025-09-03 PROCEDURE — 99214 OFFICE O/P EST MOD 30 MIN: CPT | Performed by: CLINICAL NURSE SPECIALIST

## 2025-09-03 PROCEDURE — 1036F TOBACCO NON-USER: CPT | Performed by: CLINICAL NURSE SPECIALIST

## 2025-09-03 PROCEDURE — 1159F MED LIST DOCD IN RCRD: CPT | Performed by: CLINICAL NURSE SPECIALIST

## 2025-09-03 PROCEDURE — 99212 OFFICE O/P EST SF 10 MIN: CPT | Performed by: CLINICAL NURSE SPECIALIST

## 2025-09-03 PROCEDURE — 1160F RVW MEDS BY RX/DR IN RCRD: CPT | Performed by: CLINICAL NURSE SPECIALIST

## 2025-09-03 PROCEDURE — 3008F BODY MASS INDEX DOCD: CPT | Performed by: CLINICAL NURSE SPECIALIST

## 2025-09-03 PROCEDURE — G2211 COMPLEX E/M VISIT ADD ON: HCPCS | Performed by: CLINICAL NURSE SPECIALIST

## 2025-09-03 RX ORDER — MORPHINE SULFATE 20 MG/ML
10 SOLUTION ORAL 4 TIMES DAILY PRN
Qty: 60 ML | Refills: 0 | Status: SHIPPED | OUTPATIENT
Start: 2025-09-09 | End: 2025-10-09

## 2025-09-03 RX ORDER — DULOXETIN HYDROCHLORIDE 60 MG/1
CAPSULE, DELAYED RELEASE ORAL
Qty: 30 CAPSULE | Refills: 5 | Status: SHIPPED | OUTPATIENT
Start: 2025-09-03

## 2025-09-03 ASSESSMENT — ENCOUNTER SYMPTOMS
LOSS OF SENSATION IN FEET: 0
DEPRESSION: 0
OCCASIONAL FEELINGS OF UNSTEADINESS: 0

## 2025-09-24 ENCOUNTER — APPOINTMENT (OUTPATIENT)
Dept: PRIMARY CARE | Facility: CLINIC | Age: 70
End: 2025-09-24
Payer: MEDICARE

## 2025-10-01 ENCOUNTER — APPOINTMENT (OUTPATIENT)
Dept: OTOLARYNGOLOGY | Facility: CLINIC | Age: 70
End: 2025-10-01
Payer: MEDICARE